# Patient Record
Sex: MALE | Race: WHITE | Employment: OTHER | URBAN - METROPOLITAN AREA
[De-identification: names, ages, dates, MRNs, and addresses within clinical notes are randomized per-mention and may not be internally consistent; named-entity substitution may affect disease eponyms.]

---

## 2017-07-13 ENCOUNTER — OFFICE VISIT (OUTPATIENT)
Dept: INTERNAL MEDICINE CLINIC | Age: 57
End: 2017-07-13

## 2017-07-13 VITALS — SYSTOLIC BLOOD PRESSURE: 136 MMHG | HEART RATE: 88 BPM | OXYGEN SATURATION: 97 % | DIASTOLIC BLOOD PRESSURE: 68 MMHG

## 2017-07-13 DIAGNOSIS — H66.91 RIGHT OTITIS MEDIA, UNSPECIFIED CHRONICITY, UNSPECIFIED OTITIS MEDIA TYPE: Primary | ICD-10-CM

## 2017-07-13 PROCEDURE — 99213 OFFICE O/P EST LOW 20 MIN: CPT | Performed by: NURSE PRACTITIONER

## 2017-07-13 RX ORDER — FLUTICASONE PROPIONATE 50 MCG
1 SPRAY, SUSPENSION (ML) NASAL DAILY
Qty: 1 BOTTLE | Refills: 3 | Status: SHIPPED | OUTPATIENT
Start: 2017-07-13 | End: 2021-04-15

## 2017-07-13 RX ORDER — AZITHROMYCIN 250 MG/1
250 TABLET, FILM COATED ORAL DAILY
Qty: 10 TABLET | Refills: 0 | Status: SHIPPED | OUTPATIENT
Start: 2017-07-13 | End: 2017-07-23

## 2017-10-23 ENCOUNTER — OFFICE VISIT (OUTPATIENT)
Dept: INTERNAL MEDICINE CLINIC | Age: 57
End: 2017-10-23

## 2017-10-23 VITALS
OXYGEN SATURATION: 97 % | HEIGHT: 74 IN | BODY MASS INDEX: 34.55 KG/M2 | HEART RATE: 72 BPM | SYSTOLIC BLOOD PRESSURE: 110 MMHG | WEIGHT: 269.2 LBS | DIASTOLIC BLOOD PRESSURE: 74 MMHG

## 2017-10-23 DIAGNOSIS — Z12.12 SCREENING FOR COLORECTAL CANCER: ICD-10-CM

## 2017-10-23 DIAGNOSIS — R21 RASH: ICD-10-CM

## 2017-10-23 DIAGNOSIS — Z23 NEED FOR IMMUNIZATION AGAINST INFLUENZA: ICD-10-CM

## 2017-10-23 DIAGNOSIS — Z00.00 ROUTINE GENERAL MEDICAL EXAMINATION AT A HEALTH CARE FACILITY: Primary | ICD-10-CM

## 2017-10-23 DIAGNOSIS — Z12.11 SCREENING FOR COLORECTAL CANCER: ICD-10-CM

## 2017-10-23 DIAGNOSIS — Z00.00 PREVENTATIVE HEALTH CARE: ICD-10-CM

## 2017-10-23 DIAGNOSIS — I10 ESSENTIAL HYPERTENSION: ICD-10-CM

## 2017-10-23 DIAGNOSIS — Z12.5 SCREENING FOR PROSTATE CANCER: ICD-10-CM

## 2017-10-23 DIAGNOSIS — E78.2 MIXED HYPERLIPIDEMIA: ICD-10-CM

## 2017-10-23 DIAGNOSIS — F17.200 TOBACCO DEPENDENCE: ICD-10-CM

## 2017-10-23 PROCEDURE — 99396 PREV VISIT EST AGE 40-64: CPT | Performed by: INTERNAL MEDICINE

## 2017-10-23 PROCEDURE — 90688 IIV4 VACCINE SPLT 0.5 ML IM: CPT | Performed by: INTERNAL MEDICINE

## 2017-10-23 PROCEDURE — 90471 IMMUNIZATION ADMIN: CPT | Performed by: INTERNAL MEDICINE

## 2017-10-23 RX ORDER — CLOTRIMAZOLE AND BETAMETHASONE DIPROPIONATE 10; .64 MG/G; MG/G
CREAM TOPICAL 2 TIMES DAILY
Qty: 3 TUBE | Refills: 1 | Status: SHIPPED | OUTPATIENT
Start: 2017-10-23 | End: 2021-08-12

## 2017-10-23 RX ORDER — AMLODIPINE BESYLATE 5 MG/1
5 TABLET ORAL DAILY
Qty: 90 TABLET | Refills: 3 | Status: SHIPPED | OUTPATIENT
Start: 2017-10-23 | End: 2018-07-06 | Stop reason: SDUPTHER

## 2017-10-23 RX ORDER — LOVASTATIN 10 MG/1
10 TABLET ORAL NIGHTLY
Qty: 90 TABLET | Refills: 3 | Status: SHIPPED | OUTPATIENT
Start: 2017-10-23 | End: 2018-07-06 | Stop reason: SDUPTHER

## 2017-10-23 NOTE — PROGRESS NOTES
History and Physical      Aquiles Diaz  YOB: 1960    Date of Service:  10/23/2017    Chief Complaint:   Aquiles Diaz is a 59 y.o. male who presents for complete physical examination. HPI:   Hypertension: Stable. Denies CP, SOB, cough, visual changes, dizziness, palpitations or HA. Due for colonoscopy and scr PSA, states wants to sched w Dr Lyric Jernigan. Continues to smoke and does not want to quit at this time. Stress levels better at work. Due for flu shot    Lipids:  Is continuing statin therapy and low fat diet. Tolerating medications w/o myalgias or GI upset.         Wt Readings from Last 3 Encounters:   10/23/17 269 lb 3.2 oz (122.1 kg)   12/28/16 266 lb (120.7 kg)   07/06/16 265 lb 3.2 oz (120.3 kg)     BP Readings from Last 3 Encounters:   10/23/17 110/74   07/13/17 136/68   12/28/16 128/70       Patient Active Problem List   Diagnosis    Hypertension    Hyperlipidemia    Leg pain    Tobacco dependence    Eczematous dermatitis       Preventive Care:  Health Maintenance   Topic Date Due    Hepatitis C screen  1960    HIV screen  02/02/1973    DTaP/Tdap/Td vaccine (1 - Tdap) 02/02/1977    Colon Cancer Screen FIT/FOBT  07/12/2013    Flu vaccine (1) 09/01/2017    Lipid screen  07/06/2021    Pneumococcal med risk  Completed        Lipid panel:    Lab Results   Component Value Date    TRIG 200 07/06/2016    HDL 47 07/06/2016    LDLCALC 112 07/06/2016         Immunization History   Administered Date(s) Administered    Influenza Virus Vaccine 01/14/2013    Pneumococcal Polysaccharide ([id number]) 07/06/2016       Allergies   Allergen Reactions    Hctz      Lightheadedness/nausea- had syncopal episode     Current Outpatient Prescriptions   Medication Sig Dispense Refill    fluticasone (FLONASE) 50 MCG/ACT nasal spray 1 spray by Nasal route daily 1 Bottle 3    amLODIPine (NORVASC) 5 MG tablet Take 1 tablet by mouth daily 90 tablet 3    lovastatin (MEVACOR) 10 MG tablet Take 1 tablet by mouth nightly 90 tablet 3    clotrimazole-betamethasone (LOTRISONE) 1-0.05 % cream Apply topically 2 times daily 3 Tube 1    acetaminophen (TYLENOL) 500 MG tablet Take 500 mg by mouth as needed.  predniSONE (DELTASONE) 5 MG tablet Take 6 tablets by mouth on day 1, 5 on day 2, 4 on day 3, 3 on day 4, 2 on day 5, 1 on day 6. 21 tablet 0    guaiFENesin-codeine (GUAIFENESIN AC) 100-10 MG/5ML liquid Take 5 mLs by mouth 3 times daily as needed for Cough 150 mL 0    augmented betamethasone dipropionate (DIPROLENE-AF) 0.05 % cream APPLY TO AFFECTED AREA(S) DAILY 5 Tube 5     No current facility-administered medications for this visit. Past Medical History:   Diagnosis Date    Eczematous dermatitis     Dr Jose Angel Ziegler, prn halog cream    Family history of coronary artery disease     Hyperlipidemia     Hypertension     Leg pain     R leg, chr pain.  Skin lesion     Tobacco dependence      History reviewed. No pertinent surgical history. Family History   Problem Relation Age of Onset    Other Father       of MI    Cancer Mother      \"mass behind kidney\"     Social History     Social History    Marital status:      Spouse name: N/A    Number of children: N/A    Years of education: N/A     Occupational History    as noted      [employer]     Social History Main Topics    Smoking status: Current Every Day Smoker     Packs/day: 0.50     Years: 30.00     Types: Cigarettes    Smokeless tobacco: Never Used    Alcohol use 0.0 oz/week      Comment: 6 pack a week    Drug use: Unknown    Sexual activity: Not on file     Other Topics Concern    Not on file     Social History Narrative    Diet: Unrestricted    Exercise: None    Seat Belt: Always       Review of Systems:  A comprehensive review of systems was negative except for what was noted in the HPI.     Physical Exam:   Vitals:    10/23/17 1143   BP: 110/74   Site: Left Arm   Position: Sitting   Cuff Size: Large Adult electrolytes, lipid profile. -     amLODIPine (NORVASC) 5 MG tablet; Take 1 tablet by mouth daily  -     Lipid Panel  -     Comprehensive Metabolic Panel  -     CBC Auto Differential  -     TSH with Reflex    Mixed hyperlipidemia - Pt will continue to work on a low fat diet and also exercise, wt loss as appropriate. Will continue periodic monitoring of fasting lipid profile, glucose, liver function. -     lovastatin (MEVACOR) 10 MG tablet; Take 1 tablet by mouth nightly  -     Lipid Panel  -     Comprehensive Metabolic Panel  -     CBC Auto Differential  -     TSH with Reflex    Rash- PERIODIC GROIN, CONT RX PRN  -     clotrimazole-betamethasone (LOTRISONE) 1-0.05 % cream; Apply topically 2 times daily    Preventative health care  -     INFLUENZA, QUADV, 3 YRS AND OLDER, IM, MDV, 0.5ML (Avelino Nani)    Screening for prostate cancer  -     PSA PROSTATIC SPECIFIC ANTIGEN    Tobacco dependence -  Strongly advised smoking cessation and encouraged to keep working on cutting down use as much as possible.        Need for immunization against influenza  -     Cancel: INFLUENZA, QUADV, 3 YRS AND OLDER, IM, MDV, 0.5ML (Avelino Nani)    Screening for colorectal cancer  -     Jose Austin MD- Candi Kohler MD (GAVIN)

## 2017-10-24 LAB
A/G RATIO: 2.1 (CALC) (ref 0.8–2.6)
ALBUMIN SERPL-MCNC: 4.6 GM/DL (ref 3.5–5.2)
ALP BLD-CCNC: 70 U/L (ref 23–144)
ALT SERPL-CCNC: 33 U/L (ref 0–60)
AST SERPL-CCNC: 22 U/L (ref 0–46)
BASOPHILS ABSOLUTE: 0 K/MM3 (ref 0–0.3)
BASOPHILS RELATIVE PERCENT: 0.5 % (ref 0–2)
BILIRUB SERPL-MCNC: 0.4 MG/DL (ref 0–1.2)
BUN / CREAT RATIO: 15 (CALC) (ref 7–25)
BUN BLDV-MCNC: 12 MG/DL (ref 3–29)
CALCIUM SERPL-MCNC: 9.5 MG/DL (ref 8.5–10.5)
CHLORIDE BLD-SCNC: 101 MEQ/L (ref 96–110)
CHOLESTEROL, TOTAL: 195 MG/DL
CO2: 26 MEQ/L (ref 19–32)
COPY(IES) SENT TO:: NORMAL
CREAT SERPL-MCNC: 0.8 MG/DL
EOSINOPHILS ABSOLUTE: 0.2 K/MM3 (ref 0–0.6)
EOSINOPHILS RELATIVE PERCENT: 2 % (ref 0–7)
GFR SERPL CREATININE-BSD FRML MDRD: 98 ML/MIN/1.73M2
GLOBULIN: 2.2 GM/DL (CALC) (ref 1.9–3.6)
GLUCOSE BLD-MCNC: 74 MG/DL
HCT VFR BLD CALC: 47.2 % (ref 41–50)
HDLC SERPL-MCNC: 46 MG/DL
HEMOGLOBIN: 15.5 G/DL (ref 13.8–17.2)
HEPATITIS C ANTIBODY: NEGATIVE
LDL CHOLESTEROL: 97 MG/DL (CALC)
LEUKOCYTES, UA: 8.4 K/MM3 (ref 3.8–10.8)
LYMPHOCYTES ABSOLUTE: 2.1 K/MM3 (ref 0.9–4.1)
LYMPHOCYTES RELATIVE PERCENT: 24.5 % (ref 18–47)
MCH RBC QN AUTO: 30.4 PG (ref 27–33)
MCHC RBC AUTO-ENTMCNC: 32.8 G/DL (ref 32–36)
MCV RBC AUTO: 92.9 FL (ref 80–100)
MONOCYTES ABSOLUTE: 0.7 K/MM3 (ref 0.2–1.1)
MONOCYTES RELATIVE PERCENT: 8.3 % (ref 0–14)
NEUTROPHILS ABSOLUTE: 5.4 K/MM3 (ref 1.5–7.8)
PDW BLD-RTO: 14.5 % (ref 9–15)
PLATELET # BLD: 231 K/MM3 (ref 130–400)
POTASSIUM SERPL-SCNC: 4.2 MEQ/L (ref 3.4–5.3)
PROSTATE SPECIFIC ANTIGEN: 1.53 NG/ML
RBC # BLD: 5.08 M/MM3 (ref 4.4–5.8)
SEGMENTED NEUTROPHILS RELATIVE PERCENT: 64.7 % (ref 40–75)
SODIUM BLD-SCNC: 142 MEQ/L (ref 135–148)
TOTAL PROTEIN: 6.8 GM/DL (ref 6–8.3)
TRIGL SERPL-MCNC: 258 MG/DL
TSH SERPL DL<=0.05 MIU/L-ACNC: 1.94 MICRO IU/ML (ref 0.4–4)
VLDLC SERPL CALC-MCNC: 52 MG/DL (CALC) (ref 4–38)

## 2018-02-13 ENCOUNTER — TELEPHONE (OUTPATIENT)
Dept: INTERNAL MEDICINE CLINIC | Age: 58
End: 2018-02-13

## 2018-02-13 NOTE — TELEPHONE ENCOUNTER
Wife LM stating that once a year, you sign an order for restrictions on a prescription pad. Order states \"update on restrictions.  REF [id number]. 2/13/2018-2/13/2019\"

## 2018-07-06 ENCOUNTER — OFFICE VISIT (OUTPATIENT)
Dept: INTERNAL MEDICINE CLINIC | Age: 58
End: 2018-07-06

## 2018-07-06 VITALS
OXYGEN SATURATION: 98 % | DIASTOLIC BLOOD PRESSURE: 72 MMHG | BODY MASS INDEX: 33.45 KG/M2 | HEART RATE: 76 BPM | HEIGHT: 75 IN | RESPIRATION RATE: 16 BRPM | SYSTOLIC BLOOD PRESSURE: 130 MMHG | WEIGHT: 269 LBS

## 2018-07-06 DIAGNOSIS — I10 ESSENTIAL HYPERTENSION: ICD-10-CM

## 2018-07-06 DIAGNOSIS — Z12.11 SCREENING FOR COLORECTAL CANCER: ICD-10-CM

## 2018-07-06 DIAGNOSIS — Z12.12 SCREENING FOR COLORECTAL CANCER: ICD-10-CM

## 2018-07-06 DIAGNOSIS — I87.2 VENOUS INSUFFICIENCY OF BOTH LOWER EXTREMITIES: ICD-10-CM

## 2018-07-06 DIAGNOSIS — E78.2 MIXED HYPERLIPIDEMIA: ICD-10-CM

## 2018-07-06 DIAGNOSIS — Z12.5 SCREENING FOR MALIGNANT NEOPLASM OF PROSTATE: ICD-10-CM

## 2018-07-06 DIAGNOSIS — L30.9 ECZEMA, UNSPECIFIED TYPE: ICD-10-CM

## 2018-07-06 DIAGNOSIS — Z00.00 ROUTINE GENERAL MEDICAL EXAMINATION AT A HEALTH CARE FACILITY: Primary | ICD-10-CM

## 2018-07-06 DIAGNOSIS — R21 RASH: ICD-10-CM

## 2018-07-06 DIAGNOSIS — F17.200 TOBACCO DEPENDENCE: ICD-10-CM

## 2018-07-06 LAB
A/G RATIO: 2 (ref 1.1–2.2)
ALBUMIN SERPL-MCNC: 4.6 G/DL (ref 3.4–5)
ALP BLD-CCNC: 77 U/L (ref 40–129)
ALT SERPL-CCNC: 29 U/L (ref 10–40)
ANION GAP SERPL CALCULATED.3IONS-SCNC: 9 MMOL/L (ref 3–16)
AST SERPL-CCNC: 23 U/L (ref 15–37)
BASOPHILS ABSOLUTE: 0.1 K/UL (ref 0–0.2)
BASOPHILS RELATIVE PERCENT: 0.7 %
BILIRUB SERPL-MCNC: <0.2 MG/DL (ref 0–1)
BUN BLDV-MCNC: 10 MG/DL (ref 7–20)
CALCIUM SERPL-MCNC: 9.3 MG/DL (ref 8.3–10.6)
CHLORIDE BLD-SCNC: 105 MMOL/L (ref 99–110)
CHOLESTEROL, TOTAL: 188 MG/DL (ref 0–199)
CO2: 27 MMOL/L (ref 21–32)
CREAT SERPL-MCNC: 0.9 MG/DL (ref 0.8–1.3)
EOSINOPHILS ABSOLUTE: 0.2 K/UL (ref 0–0.6)
EOSINOPHILS RELATIVE PERCENT: 1.9 %
GFR AFRICAN AMERICAN: >60
GFR NON-AFRICAN AMERICAN: >60
GLOBULIN: 2.3 G/DL
GLUCOSE BLD-MCNC: 100 MG/DL (ref 70–99)
HCT VFR BLD CALC: 45.3 % (ref 40.5–52.5)
HDLC SERPL-MCNC: 44 MG/DL (ref 40–60)
HEMOGLOBIN: 15.5 G/DL (ref 13.5–17.5)
LDL CHOLESTEROL CALCULATED: 102 MG/DL
LYMPHOCYTES ABSOLUTE: 1.9 K/UL (ref 1–5.1)
LYMPHOCYTES RELATIVE PERCENT: 22.6 %
MCH RBC QN AUTO: 31.5 PG (ref 26–34)
MCHC RBC AUTO-ENTMCNC: 34.3 G/DL (ref 31–36)
MCV RBC AUTO: 92.1 FL (ref 80–100)
MONOCYTES ABSOLUTE: 0.7 K/UL (ref 0–1.3)
MONOCYTES RELATIVE PERCENT: 8.5 %
NEUTROPHILS ABSOLUTE: 5.6 K/UL (ref 1.7–7.7)
NEUTROPHILS RELATIVE PERCENT: 66.3 %
PDW BLD-RTO: 14.1 % (ref 12.4–15.4)
PLATELET # BLD: 229 K/UL (ref 135–450)
PMV BLD AUTO: 7.7 FL (ref 5–10.5)
POTASSIUM SERPL-SCNC: 5 MMOL/L (ref 3.5–5.1)
PROSTATE SPECIFIC ANTIGEN: 2.7 NG/ML (ref 0–4)
RBC # BLD: 4.92 M/UL (ref 4.2–5.9)
SODIUM BLD-SCNC: 141 MMOL/L (ref 136–145)
TOTAL PROTEIN: 6.9 G/DL (ref 6.4–8.2)
TRIGL SERPL-MCNC: 211 MG/DL (ref 0–150)
TSH REFLEX: 3.14 UIU/ML (ref 0.27–4.2)
VLDLC SERPL CALC-MCNC: 42 MG/DL
WBC # BLD: 8.4 K/UL (ref 4–11)

## 2018-07-06 PROCEDURE — 99396 PREV VISIT EST AGE 40-64: CPT | Performed by: INTERNAL MEDICINE

## 2018-07-06 RX ORDER — LOVASTATIN 10 MG/1
10 TABLET ORAL NIGHTLY
Qty: 90 TABLET | Refills: 3 | Status: SHIPPED | OUTPATIENT
Start: 2018-07-06 | End: 2019-08-13 | Stop reason: SDUPTHER

## 2018-07-06 RX ORDER — AMLODIPINE BESYLATE 5 MG/1
5 TABLET ORAL DAILY
Qty: 90 TABLET | Refills: 3 | Status: SHIPPED | OUTPATIENT
Start: 2018-07-06 | End: 2019-08-13 | Stop reason: SDUPTHER

## 2018-07-06 RX ORDER — BETAMETHASONE DIPROPIONATE 0.5 MG/G
CREAM TOPICAL
Qty: 5 TUBE | Refills: 6 | Status: SHIPPED | OUTPATIENT
Start: 2018-07-06 | End: 2019-08-13 | Stop reason: SDUPTHER

## 2018-07-06 ASSESSMENT — PATIENT HEALTH QUESTIONNAIRE - PHQ9
2. FEELING DOWN, DEPRESSED OR HOPELESS: 0
SUM OF ALL RESPONSES TO PHQ9 QUESTIONS 1 & 2: 0
SUM OF ALL RESPONSES TO PHQ QUESTIONS 1-9: 0
1. LITTLE INTEREST OR PLEASURE IN DOING THINGS: 0

## 2018-07-06 NOTE — PROGRESS NOTES
distress. HEENT:   Head: Normocephalic and atraumatic. Nose: Nose normal.   Mouth/Throat: Oropharynx is clear and moist and mucous membranes are normal. No oropharyngeal exudate or posterior oropharyngeal erythema. He has no cervical adenopathy. Eyes: Conjunctivae and extraocular motions are normal. Pupils are equal, round, and reactive to light. Neck: Full passive range of motion without pain. Neck supple. No JVD present. No mass and no thyromegaly present. Cardiovascular: Normal rate, regular rhythm, normal heart sounds and intact distal pulses. Exam reveals no gallop and no friction rub. No murmur heard. Pulmonary/Chest: Effort normal and breath sounds normal. No respiratory distress. He has no wheezes, rhonchi or rales. Abdominal: Soft, non-tender. Bowel sounds and aorta are normal. There is no organomegaly, mass or bruit. Musculoskeletal: Normal range of motion, no synovitis. He exhibits no edema. SMALL VARICOSE V'S NOTED R>L LEGS  Neurological: He is alert and oriented to person, place, and time. No cranial nerve deficit. Coordination normal.   Skin: Skin is warm, dry and intact. Psychiatric: He has a normal mood and affect. His speech is normal and behavior is normal. Judgment, cognition and memory are normal.         Assessment/Plan:    Verenice Medina was seen today for 6 month follow-up. Diagnoses and all orders for this visit:    Routine general medical examination at a health care facility - Overall general medical exam appears benign, other assessments as noted and testing is as noted below. Essential hypertension - Blood pressure stable and will continue current regimen. Will plan periodic monitoring of renal function, electrolytes, lipid profile. -     amLODIPine (NORVASC) 5 MG tablet; Take 1 tablet by mouth daily  -     Lipid Panel; Future  -     Comprehensive Metabolic Panel; Future  -     TSH with Reflex;  Future  -     CBC Auto Differential; Future    Mixed hyperlipidemia - Pt will continue to work on a low fat diet and also exercise, wt loss as appropriate. Will continue periodic monitoring of fasting lipid profile, glucose, liver function. -     lovastatin (MEVACOR) 10 MG tablet; Take 1 tablet by mouth nightly  -     Lipid Panel; Future  -     Comprehensive Metabolic Panel; Future  -     TSH with Reflex; Future  -     CBC Auto Differential; Future    Tobacco dependence -  Strongly advised smoking cessation and encouraged to keep working on cutting down use as much as possible. Screening for colorectal cancer- DEFERS COLONOSCOPY  -     BLOOD OCCULT STOOL #1; Future    Screening for malignant neoplasm of prostate - DUE  -     PSA PROSTATIC SPECIFIC ANTIGEN; Future    Eczema, unspecified type- CONT PRN TOPICAL STEROID, IF WORSENS MAY NEED DERM EVAL.       Rash  -     augmented betamethasone dipropionate (DIPROLENE-AF) 0.05 % cream; APPLY TO AFFECTED AREA(S) DAILY    Venous insufficiency of both lower extremities- COULD EXACERBATE HIS LEG RASH, REC SURGERY EVAL FOR POSSIBLE ABLATION/VNUS, HE IS TO CALL FOR APPT W DR BURNS WHEN HIS SCHED ALLOWS  -     External Referral To General Surgery

## 2018-12-07 ENCOUNTER — OFFICE VISIT (OUTPATIENT)
Dept: INTERNAL MEDICINE CLINIC | Age: 58
End: 2018-12-07
Payer: COMMERCIAL

## 2018-12-07 VITALS
BODY MASS INDEX: 33.12 KG/M2 | SYSTOLIC BLOOD PRESSURE: 127 MMHG | RESPIRATION RATE: 15 BRPM | HEART RATE: 83 BPM | OXYGEN SATURATION: 98 % | WEIGHT: 265 LBS | DIASTOLIC BLOOD PRESSURE: 72 MMHG

## 2018-12-07 DIAGNOSIS — J01.00 ACUTE NON-RECURRENT MAXILLARY SINUSITIS: Primary | ICD-10-CM

## 2018-12-07 PROCEDURE — 99213 OFFICE O/P EST LOW 20 MIN: CPT | Performed by: INTERNAL MEDICINE

## 2018-12-07 RX ORDER — BENZONATATE 100 MG/1
100 CAPSULE ORAL 2 TIMES DAILY PRN
Qty: 20 CAPSULE | Refills: 0 | Status: SHIPPED | OUTPATIENT
Start: 2018-12-07 | End: 2018-12-14

## 2018-12-07 RX ORDER — AZITHROMYCIN 250 MG/1
250 TABLET, FILM COATED ORAL SEE ADMIN INSTRUCTIONS
Qty: 6 TABLET | Refills: 0 | Status: SHIPPED | OUTPATIENT
Start: 2018-12-07 | End: 2018-12-12

## 2018-12-07 RX ORDER — PREDNISONE 1 MG/1
TABLET ORAL
Qty: 36 TABLET | Refills: 0 | Status: SHIPPED | OUTPATIENT
Start: 2018-12-07 | End: 2019-08-13 | Stop reason: ALTCHOICE

## 2019-08-13 ENCOUNTER — OFFICE VISIT (OUTPATIENT)
Dept: INTERNAL MEDICINE CLINIC | Age: 59
End: 2019-08-13
Payer: COMMERCIAL

## 2019-08-13 VITALS
OXYGEN SATURATION: 97 % | RESPIRATION RATE: 14 BRPM | HEART RATE: 60 BPM | SYSTOLIC BLOOD PRESSURE: 126 MMHG | WEIGHT: 261 LBS | DIASTOLIC BLOOD PRESSURE: 78 MMHG | HEIGHT: 75 IN | BODY MASS INDEX: 32.45 KG/M2

## 2019-08-13 DIAGNOSIS — I10 ESSENTIAL HYPERTENSION: ICD-10-CM

## 2019-08-13 DIAGNOSIS — E78.2 MIXED HYPERLIPIDEMIA: ICD-10-CM

## 2019-08-13 DIAGNOSIS — Z00.00 ROUTINE GENERAL MEDICAL EXAMINATION AT A HEALTH CARE FACILITY: Primary | ICD-10-CM

## 2019-08-13 DIAGNOSIS — Z12.11 SCREENING FOR COLORECTAL CANCER: ICD-10-CM

## 2019-08-13 DIAGNOSIS — R21 RASH: ICD-10-CM

## 2019-08-13 DIAGNOSIS — H93.8X1 CONGESTION OF RIGHT EAR: ICD-10-CM

## 2019-08-13 DIAGNOSIS — Z12.12 SCREENING FOR COLORECTAL CANCER: ICD-10-CM

## 2019-08-13 LAB
A/G RATIO: 2.4 (ref 1.1–2.2)
ALBUMIN SERPL-MCNC: 4.8 G/DL (ref 3.4–5)
ALP BLD-CCNC: 70 U/L (ref 40–129)
ALT SERPL-CCNC: 21 U/L (ref 10–40)
ANION GAP SERPL CALCULATED.3IONS-SCNC: 16 MMOL/L (ref 3–16)
AST SERPL-CCNC: 22 U/L (ref 15–37)
BASOPHILS ABSOLUTE: 0 K/UL (ref 0–0.2)
BASOPHILS RELATIVE PERCENT: 0.5 %
BILIRUB SERPL-MCNC: 0.5 MG/DL (ref 0–1)
BILIRUBIN URINE: NEGATIVE
BLOOD, URINE: NEGATIVE
BUN BLDV-MCNC: 8 MG/DL (ref 7–20)
CALCIUM SERPL-MCNC: 10 MG/DL (ref 8.3–10.6)
CHLORIDE BLD-SCNC: 104 MMOL/L (ref 99–110)
CHOLESTEROL, TOTAL: 161 MG/DL (ref 0–199)
CLARITY: CLEAR
CO2: 25 MMOL/L (ref 21–32)
COLOR: YELLOW
CREAT SERPL-MCNC: 0.8 MG/DL (ref 0.8–1.3)
EOSINOPHILS ABSOLUTE: 0.2 K/UL (ref 0–0.6)
EOSINOPHILS RELATIVE PERCENT: 2.2 %
EPITHELIAL CELLS, UA: 0 /HPF (ref 0–5)
GFR AFRICAN AMERICAN: >60
GFR NON-AFRICAN AMERICAN: >60
GLOBULIN: 2 G/DL
GLUCOSE BLD-MCNC: 92 MG/DL (ref 70–99)
GLUCOSE URINE: NEGATIVE MG/DL
HCT VFR BLD CALC: 46.4 % (ref 40.5–52.5)
HDLC SERPL-MCNC: 49 MG/DL (ref 40–60)
HEMOGLOBIN: 15.5 G/DL (ref 13.5–17.5)
HYALINE CASTS: 0 /LPF (ref 0–8)
KETONES, URINE: NEGATIVE MG/DL
LDL CHOLESTEROL CALCULATED: 87 MG/DL
LEUKOCYTE ESTERASE, URINE: NEGATIVE
LYMPHOCYTES ABSOLUTE: 1.6 K/UL (ref 1–5.1)
LYMPHOCYTES RELATIVE PERCENT: 23.5 %
MCH RBC QN AUTO: 31.6 PG (ref 26–34)
MCHC RBC AUTO-ENTMCNC: 33.5 G/DL (ref 31–36)
MCV RBC AUTO: 94.5 FL (ref 80–100)
MICROSCOPIC EXAMINATION: YES
MONOCYTES ABSOLUTE: 0.6 K/UL (ref 0–1.3)
MONOCYTES RELATIVE PERCENT: 8.7 %
NEUTROPHILS ABSOLUTE: 4.5 K/UL (ref 1.7–7.7)
NEUTROPHILS RELATIVE PERCENT: 65.1 %
NITRITE, URINE: NEGATIVE
PDW BLD-RTO: 14.2 % (ref 12.4–15.4)
PH UA: 7.5 (ref 5–8)
PLATELET # BLD: 239 K/UL (ref 135–450)
PMV BLD AUTO: 7.6 FL (ref 5–10.5)
POTASSIUM SERPL-SCNC: 5.3 MMOL/L (ref 3.5–5.1)
PROTEIN UA: ABNORMAL MG/DL
RBC # BLD: 4.91 M/UL (ref 4.2–5.9)
RBC UA: 3 /HPF (ref 0–4)
SODIUM BLD-SCNC: 145 MMOL/L (ref 136–145)
SPECIFIC GRAVITY UA: 1.02 (ref 1–1.03)
TOTAL PROTEIN: 6.8 G/DL (ref 6.4–8.2)
TRIGL SERPL-MCNC: 127 MG/DL (ref 0–150)
TSH SERPL DL<=0.05 MIU/L-ACNC: 1.87 UIU/ML (ref 0.27–4.2)
URINE TYPE: ABNORMAL
UROBILINOGEN, URINE: 0.2 E.U./DL
VLDLC SERPL CALC-MCNC: 25 MG/DL
WBC # BLD: 6.9 K/UL (ref 4–11)
WBC UA: 0 /HPF (ref 0–5)

## 2019-08-13 PROCEDURE — 99396 PREV VISIT EST AGE 40-64: CPT | Performed by: INTERNAL MEDICINE

## 2019-08-13 RX ORDER — FLUTICASONE PROPIONATE 50 MCG
2 SPRAY, SUSPENSION (ML) NASAL DAILY
Qty: 1 BOTTLE | Refills: 0 | Status: SHIPPED | OUTPATIENT
Start: 2019-08-13 | End: 2019-10-04

## 2019-08-13 RX ORDER — PREDNISONE 1 MG/1
TABLET ORAL
Qty: 21 TABLET | Refills: 0 | Status: SHIPPED | OUTPATIENT
Start: 2019-08-13 | End: 2019-10-04 | Stop reason: ALTCHOICE

## 2019-08-13 RX ORDER — LOVASTATIN 10 MG/1
10 TABLET ORAL NIGHTLY
Qty: 90 TABLET | Refills: 3 | Status: SHIPPED | OUTPATIENT
Start: 2019-08-13 | End: 2020-07-01 | Stop reason: SDUPTHER

## 2019-08-13 RX ORDER — BETAMETHASONE DIPROPIONATE 0.5 MG/G
CREAM TOPICAL
Qty: 5 TUBE | Refills: 6 | Status: SHIPPED | OUTPATIENT
Start: 2019-08-13 | End: 2020-07-01 | Stop reason: SDUPTHER

## 2019-08-13 RX ORDER — AMLODIPINE BESYLATE 5 MG/1
5 TABLET ORAL DAILY
Qty: 90 TABLET | Refills: 3 | Status: SHIPPED | OUTPATIENT
Start: 2019-08-13 | End: 2021-04-28

## 2019-08-13 ASSESSMENT — PATIENT HEALTH QUESTIONNAIRE - PHQ9
1. LITTLE INTEREST OR PLEASURE IN DOING THINGS: 0
SUM OF ALL RESPONSES TO PHQ QUESTIONS 1-9: 0
2. FEELING DOWN, DEPRESSED OR HOPELESS: 0
SUM OF ALL RESPONSES TO PHQ QUESTIONS 1-9: 0
SUM OF ALL RESPONSES TO PHQ9 QUESTIONS 1 & 2: 0

## 2019-08-13 NOTE — PROGRESS NOTES
day 2, 4 on day 3, 3 on day 4, 2 on day 5, 1 on day 6.     Screening for colorectal cancer- due for screening and pt req Dr Jorge Reid who has taken care of his wife in the past  -     AFL (Shereen Self) - Rafi Santana MD, Gastroenterology, [facility]

## 2019-10-04 ENCOUNTER — OFFICE VISIT (OUTPATIENT)
Dept: INTERNAL MEDICINE CLINIC | Age: 59
End: 2019-10-04
Payer: COMMERCIAL

## 2019-10-04 VITALS
RESPIRATION RATE: 16 BRPM | OXYGEN SATURATION: 96 % | HEART RATE: 72 BPM | DIASTOLIC BLOOD PRESSURE: 76 MMHG | SYSTOLIC BLOOD PRESSURE: 124 MMHG

## 2019-10-04 DIAGNOSIS — J01.00 ACUTE NON-RECURRENT MAXILLARY SINUSITIS: Primary | ICD-10-CM

## 2019-10-04 PROCEDURE — 99213 OFFICE O/P EST LOW 20 MIN: CPT | Performed by: INTERNAL MEDICINE

## 2019-10-04 RX ORDER — BENZONATATE 100 MG/1
100 CAPSULE ORAL 3 TIMES DAILY PRN
Qty: 30 CAPSULE | Refills: 0 | Status: SHIPPED | OUTPATIENT
Start: 2019-10-04 | End: 2019-10-11

## 2019-10-04 RX ORDER — PREDNISONE 1 MG/1
TABLET ORAL
Qty: 21 TABLET | Refills: 0 | Status: SHIPPED | OUTPATIENT
Start: 2019-10-04 | End: 2020-07-01

## 2019-10-04 RX ORDER — AZITHROMYCIN 250 MG/1
250 TABLET, FILM COATED ORAL SEE ADMIN INSTRUCTIONS
Qty: 6 TABLET | Refills: 0 | Status: SHIPPED | OUTPATIENT
Start: 2019-10-04 | End: 2019-10-09

## 2020-07-01 ENCOUNTER — OFFICE VISIT (OUTPATIENT)
Dept: INTERNAL MEDICINE CLINIC | Age: 60
End: 2020-07-01
Payer: COMMERCIAL

## 2020-07-01 VITALS
OXYGEN SATURATION: 96 % | DIASTOLIC BLOOD PRESSURE: 58 MMHG | RESPIRATION RATE: 16 BRPM | BODY MASS INDEX: 33.5 KG/M2 | SYSTOLIC BLOOD PRESSURE: 139 MMHG | WEIGHT: 268 LBS | HEART RATE: 88 BPM

## 2020-07-01 LAB
A/G RATIO: 2 (ref 1.1–2.2)
ALBUMIN SERPL-MCNC: 4.7 G/DL (ref 3.4–5)
ALP BLD-CCNC: 76 U/L (ref 40–129)
ALT SERPL-CCNC: 34 U/L (ref 10–40)
ANION GAP SERPL CALCULATED.3IONS-SCNC: 11 MMOL/L (ref 3–16)
AST SERPL-CCNC: 28 U/L (ref 15–37)
BASOPHILS ABSOLUTE: 0 K/UL (ref 0–0.2)
BASOPHILS RELATIVE PERCENT: 0.2 %
BILIRUB SERPL-MCNC: 0.3 MG/DL (ref 0–1)
BUN BLDV-MCNC: 10 MG/DL (ref 7–20)
CALCIUM SERPL-MCNC: 9.4 MG/DL (ref 8.3–10.6)
CHLORIDE BLD-SCNC: 102 MMOL/L (ref 99–110)
CHOLESTEROL, TOTAL: 178 MG/DL (ref 0–199)
CO2: 27 MMOL/L (ref 21–32)
CREAT SERPL-MCNC: 0.9 MG/DL (ref 0.8–1.3)
EOSINOPHILS ABSOLUTE: 0.1 K/UL (ref 0–0.6)
EOSINOPHILS RELATIVE PERCENT: 1.8 %
GFR AFRICAN AMERICAN: >60
GFR NON-AFRICAN AMERICAN: >60
GLOBULIN: 2.4 G/DL
GLUCOSE BLD-MCNC: 112 MG/DL (ref 70–99)
HCT VFR BLD CALC: 47.6 % (ref 40.5–52.5)
HDLC SERPL-MCNC: 44 MG/DL (ref 40–60)
HEMOGLOBIN: 16 G/DL (ref 13.5–17.5)
LDL CHOLESTEROL CALCULATED: 84 MG/DL
LYMPHOCYTES ABSOLUTE: 1.5 K/UL (ref 1–5.1)
LYMPHOCYTES RELATIVE PERCENT: 18.9 %
MCH RBC QN AUTO: 31.4 PG (ref 26–34)
MCHC RBC AUTO-ENTMCNC: 33.5 G/DL (ref 31–36)
MCV RBC AUTO: 93.6 FL (ref 80–100)
MONOCYTES ABSOLUTE: 0.6 K/UL (ref 0–1.3)
MONOCYTES RELATIVE PERCENT: 7.6 %
NEUTROPHILS ABSOLUTE: 5.6 K/UL (ref 1.7–7.7)
NEUTROPHILS RELATIVE PERCENT: 71.5 %
PDW BLD-RTO: 14.3 % (ref 12.4–15.4)
PLATELET # BLD: 274 K/UL (ref 135–450)
PMV BLD AUTO: 8.2 FL (ref 5–10.5)
POTASSIUM SERPL-SCNC: 4.7 MMOL/L (ref 3.5–5.1)
PROSTATE SPECIFIC ANTIGEN: 1.78 NG/ML (ref 0–4)
RBC # BLD: 5.09 M/UL (ref 4.2–5.9)
SODIUM BLD-SCNC: 140 MMOL/L (ref 136–145)
TOTAL PROTEIN: 7.1 G/DL (ref 6.4–8.2)
TRIGL SERPL-MCNC: 251 MG/DL (ref 0–150)
TSH SERPL DL<=0.05 MIU/L-ACNC: 1.34 UIU/ML (ref 0.27–4.2)
VLDLC SERPL CALC-MCNC: 50 MG/DL
WBC # BLD: 7.9 K/UL (ref 4–11)

## 2020-07-01 PROCEDURE — 99396 PREV VISIT EST AGE 40-64: CPT | Performed by: INTERNAL MEDICINE

## 2020-07-01 PROCEDURE — 36415 COLL VENOUS BLD VENIPUNCTURE: CPT | Performed by: INTERNAL MEDICINE

## 2020-07-01 RX ORDER — LOSARTAN POTASSIUM 25 MG/1
25 TABLET ORAL DAILY
Qty: 90 TABLET | Refills: 1 | Status: SHIPPED | OUTPATIENT
Start: 2020-07-01 | End: 2020-10-26 | Stop reason: SDUPTHER

## 2020-07-01 RX ORDER — LOVASTATIN 10 MG/1
10 TABLET ORAL NIGHTLY
Qty: 90 TABLET | Refills: 3 | Status: SHIPPED | OUTPATIENT
Start: 2020-07-01 | End: 2020-07-02 | Stop reason: SDUPTHER

## 2020-07-01 RX ORDER — BETAMETHASONE DIPROPIONATE 0.5 MG/G
CREAM TOPICAL
Qty: 5 TUBE | Refills: 6 | Status: SHIPPED | OUTPATIENT
Start: 2020-07-01 | End: 2021-04-28

## 2020-07-01 RX ORDER — AMLODIPINE BESYLATE 5 MG/1
5 TABLET ORAL DAILY
Qty: 90 TABLET | Refills: 3 | Status: CANCELLED | OUTPATIENT
Start: 2020-07-01

## 2020-07-01 RX ORDER — AMOXICILLIN 500 MG/1
500 CAPSULE ORAL 2 TIMES DAILY
Qty: 20 CAPSULE | Refills: 0 | Status: SHIPPED | OUTPATIENT
Start: 2020-07-01 | End: 2020-07-11

## 2020-07-01 ASSESSMENT — PATIENT HEALTH QUESTIONNAIRE - PHQ9
SUM OF ALL RESPONSES TO PHQ QUESTIONS 1-9: 0
SUM OF ALL RESPONSES TO PHQ9 QUESTIONS 1 & 2: 0
SUM OF ALL RESPONSES TO PHQ QUESTIONS 1-9: 0
DEPRESSION UNABLE TO ASSESS: FUNCTIONAL CAPACITY MOTIVATION LIMITS ACCURACY
1. LITTLE INTEREST OR PLEASURE IN DOING THINGS: 0
2. FEELING DOWN, DEPRESSED OR HOPELESS: 0

## 2020-07-01 NOTE — PATIENT INSTRUCTIONS
Dr Ubaldo Toro will call you for appt to sched colonoscopy. Call us back w your BPs in 1-2 weeks after on losartan and off your norvasc/amlodipine. Let us know if leg swelling is also no better in 1-2 weeks.     We will prescribe amoxicillin antibiotic for your ear congestion

## 2020-07-01 NOTE — PROGRESS NOTES
Immunization History   Administered Date(s) Administered    Influenza 2013    Influenza, Lisbet Shellie, IM, (6 mo and older Fluzone, Flulaval, Fluarix and 3 yrs and older Afluria) 10/23/2017    Pneumococcal Polysaccharide ([id number]) 2016       Allergies   Allergen Reactions    Hctz      Lightheadedness/nausea- had syncopal episode     Current Outpatient Medications   Medication Sig Dispense Refill    amLODIPine (NORVASC) 5 MG tablet Take 1 tablet by mouth daily 90 tablet 3    lovastatin (MEVACOR) 10 MG tablet Take 1 tablet by mouth nightly 90 tablet 3    augmented betamethasone dipropionate (DIPROLENE-AF) 0.05 % cream APPLY TO AFFECTED AREA(S) DAILY 5 Tube 6    clotrimazole-betamethasone (LOTRISONE) 1-0.05 % cream Apply topically 2 times daily 3 Tube 1    acetaminophen (TYLENOL) 500 MG tablet Take 500 mg by mouth as needed.  fluticasone (FLONASE) 50 MCG/ACT nasal spray 1 spray by Nasal route daily (Patient not taking: Reported on 2020) 1 Bottle 3     No current facility-administered medications for this visit. Past Medical History:   Diagnosis Date    Eczematous dermatitis     Dr Venice Hollis, prn halog cream    Family history of coronary artery disease     Hyperlipidemia     Hypertension     Leg pain     R leg, chr pain.  Skin lesion     Tobacco dependence      No past surgical history on file.   Family History   Problem Relation Age of Onset    Other Father          of MI    Cancer Mother         \"mass behind kidney\"     Social History     Socioeconomic History    Marital status:      Spouse name: Not on file    Number of children: Not on file    Years of education: Not on file    Highest education level: Not on file   Occupational History    Occupation: as noted     Comment: [employer]   Social Needs    Financial resource strain: Not on file    Food insecurity     Worry: Not on file     Inability: Not on file   [employer] needs     Medical: Not on file     Non-medical: Not on file   Tobacco Use    Smoking status: Current Every Day Smoker     Packs/day: 0.50     Years: 30.00     Pack years: 15.00     Types: Cigarettes    Smokeless tobacco: Never Used   Substance and Sexual Activity    Alcohol use: Yes     Alcohol/week: 0.0 standard drinks     Comment: 6 pack a week    Drug use: Not on file    Sexual activity: Not on file   Lifestyle    Physical activity     Days per week: Not on file     Minutes per session: Not on file    Stress: Not on file   Relationships    Social connections     Talks on phone: Not on file     Gets together: Not on file     Attends Voodoo service: Not on file     Active member of club or organization: Not on file     Attends meetings of clubs or organizations: Not on file     Relationship status: Not on file    Intimate partner violence     Fear of current or ex partner: Not on file     Emotionally abused: Not on file     Physically abused: Not on file     Forced sexual activity: Not on file   Other Topics Concern    Not on file   Social History Narrative    Diet: Unrestricted    Exercise: None    Seat Belt: Always       Review of Systems:  A comprehensive review of systems was negative except for what was noted in the HPI. Physical Exam:   Vitals:    07/01/20 1415   BP: (!) 139/58   Pulse: 88   Resp: 16   SpO2: 96%   Weight: 268 lb (121.6 kg)     Body mass index is 33.5 kg/m². Constitutional: He is oriented to person, place, and time. He appears well-developed and well-nourished. No distress. HEENT:   Head: Normocephalic and atraumatic. Right Ear: R TM W SL ERYTHEMA AND TR OPACIFIED. L TM AND CANAL ARE CLEAR  Left Ear: Tympanic membrane, external ear and ear canal normal.   Nose: Nose normal.   Mouth/Throat: Oropharynx is clear and moist and mucous membranes are normal. No oropharyngeal exudate or posterior oropharyngeal erythema. He has no cervical adenopathy.    Eyes: Conjunctivae and extraocular motions are normal. Pupils are equal, round, and reactive to light. Neck: Full passive range of motion without pain. Neck supple. No JVD present. No mass and no thyromegaly present. Cardiovascular: Normal rate, regular rhythm, normal heart sounds and intact distal pulses. Exam reveals no gallop and no friction rub. No murmur heard. Pulmonary/Chest: Effort normal and breath sounds normal. No respiratory distress. He has no wheezes, rhonchi or rales. Abdominal: Soft, non-tender. Bowel sounds and aorta are normal. There is no organomegaly, mass or bruit. Musculoskeletal: Normal range of motion, no synovitis. He exhibits R>L LEG EDEMA W SOME VENOUS STASIS  Neurological: He is alert and oriented to person, place, and time. H No cranial nerve deficit. Coordination normal.   Skin: Skin is warm, dry and intact. Psychiatric: He has a normal mood and affect. His speech is normal and behavior is normal. Judgment, cognition and memory are normal.         Assessment/Plan:    Jimmie Molina was seen today for annual exam, leg swelling and ear fullness. Diagnoses and all orders for this visit:    Routine general medical examination at a health care facility- Overall general medical exam appears benign, other assessments as noted and testing is as noted below. AS NOTED, IS RETIRING END OF SEPT OF THIS YR    Essential hypertension- NORVASC LIKELY EXAC LEG SWELLING. SWITCH TO LOSARTAN THEN HE'LL CALL US BACK W BP 2-3 WEEKS. F/U LAB  -     losartan (COZAAR) 25 MG tablet; Take 1 tablet by mouth daily  -     Comprehensive Metabolic Panel; Future  -     CBC Auto Differential; Future  -     Lipid Panel; Future  -     TSH without Reflex; Future    Mixed hyperlipidemia - Pt will continue to work on a low fat diet and also exercise, wt loss as appropriate. Will continue periodic monitoring of fasting lipid profile, glucose, liver function. -     lovastatin (MEVACOR) 10 MG tablet;  Take 1 tablet by mouth nightly  -     Comprehensive Metabolic Panel; Future  -     CBC Auto Differential; Future  -     Lipid Panel; Future  -     TSH without Reflex; Future    Rash- PERIODIC REDNESS, ERYTHEMA OF LEGS, OK FOR CONT PRN RX  -     augmented betamethasone dipropionate (DIPROLENE-AF) 0.05 % cream; APPLY TO AFFECTED AREA(S) DAILY    Chronic venous stasis- WILL MONITOR BUT HOPEFULLY ALSO WILL IMPROVE SIGNIFICANTLY OFF NORVASC    Ear congestion, bilateral- W PROB INFECTION R EAR, TREAT W COURSE OF AMOX  -     amoxicillin (AMOXIL) 500 MG capsule; Take 1 capsule by mouth 2 times daily for 10 days    Screening for colorectal cancer- TO [de-identified] W DR VEGA FOR SEPT - Amb External Referral To Gastroenterology    Screening for malignant neoplasm of prostate  -     PSA, Prostatic Specific Antigen;  Future

## 2020-07-02 RX ORDER — LOVASTATIN 10 MG/1
10 TABLET ORAL NIGHTLY
Qty: 90 TABLET | Refills: 3 | Status: SHIPPED | OUTPATIENT
Start: 2020-07-02 | End: 2021-08-12 | Stop reason: SDUPTHER

## 2020-07-02 NOTE — RESULT ENCOUNTER NOTE
Call pt, labs ok/chol ok- his psa/prostate test and also thyroid fxn are nl. Only lab of significance is his glucose is sl elevated in prediabetic range.   Would rec some work on regular exercise and also low carb diet, aiming for 10# wt loss to reduce risk for progression to DM in the future

## 2020-10-26 RX ORDER — LOSARTAN POTASSIUM 25 MG/1
25 TABLET ORAL DAILY
Qty: 90 TABLET | Refills: 1 | Status: SHIPPED | OUTPATIENT
Start: 2020-10-26 | End: 2021-04-20

## 2020-10-27 ENCOUNTER — NURSE ONLY (OUTPATIENT)
Dept: INTERNAL MEDICINE CLINIC | Age: 60
End: 2020-10-27
Payer: COMMERCIAL

## 2020-10-27 PROCEDURE — 90471 IMMUNIZATION ADMIN: CPT | Performed by: NURSE PRACTITIONER

## 2020-10-27 PROCEDURE — 90688 IIV4 VACCINE SPLT 0.5 ML IM: CPT | Performed by: NURSE PRACTITIONER

## 2021-04-15 ENCOUNTER — VIRTUAL VISIT (OUTPATIENT)
Dept: INTERNAL MEDICINE CLINIC | Age: 61
End: 2021-04-15
Payer: COMMERCIAL

## 2021-04-15 DIAGNOSIS — J01.00 ACUTE NON-RECURRENT MAXILLARY SINUSITIS: Primary | ICD-10-CM

## 2021-04-15 DIAGNOSIS — R00.2 PALPITATION: ICD-10-CM

## 2021-04-15 PROCEDURE — 99213 OFFICE O/P EST LOW 20 MIN: CPT | Performed by: INTERNAL MEDICINE

## 2021-04-15 RX ORDER — PREDNISONE 1 MG/1
TABLET ORAL
Qty: 21 TABLET | Refills: 0 | Status: SHIPPED | OUTPATIENT
Start: 2021-04-15 | End: 2021-04-28

## 2021-04-15 RX ORDER — AMOXICILLIN 500 MG/1
500 CAPSULE ORAL 2 TIMES DAILY
Qty: 20 CAPSULE | Refills: 0 | Status: SHIPPED | OUTPATIENT
Start: 2021-04-15 | End: 2021-04-25

## 2021-04-15 ASSESSMENT — PATIENT HEALTH QUESTIONNAIRE - PHQ9
SUM OF ALL RESPONSES TO PHQ9 QUESTIONS 1 & 2: 0
SUM OF ALL RESPONSES TO PHQ QUESTIONS 1-9: 0
1. LITTLE INTEREST OR PLEASURE IN DOING THINGS: 0
2. FEELING DOWN, DEPRESSED OR HOPELESS: 0
SUM OF ALL RESPONSES TO PHQ QUESTIONS 1-9: 0

## 2021-04-15 NOTE — PROGRESS NOTES
4/15/2021    TELEHEALTH EVALUATION -- Audio/Visual (During [id number] public health emergency)    HPI:    Josesito Ponce (:  1960) has requested an audio/video evaluation for the following concern(s):    The past 3 weeks w onset of sinus congestion, cough and wheezing. No fever or chills. Taste and smell intact. Had COVID first vaccine 2d ago. R lower ribs aching, not worse w deep breaths, had brief heart flutter. The following day R face was briefly numb- for 3 hrs. Sometimes w sx fatigue and CARTER. Review of Systems    Prior to Visit Medications    Medication Sig Taking? Authorizing Provider   losartan (COZAAR) 25 MG tablet Take 1 tablet by mouth daily Yes Nannette Paiz MD   lovastatin (MEVACOR) 10 MG tablet Take 1 tablet by mouth nightly Yes Nannette Paiz MD   augmented betamethasone dipropionate (DIPROLENE-AF) 0.05 % cream APPLY TO AFFECTED AREA(S) DAILY Yes Nannette Paiz MD   amLODIPine (NORVASC) 5 MG tablet Take 1 tablet by mouth daily Yes Nannette Paiz MD   clotrimazole-betamethasone (LOTRISONE) 1-0.05 % cream Apply topically 2 times daily Yes Nannette Piaz MD   acetaminophen (TYLENOL) 500 MG tablet Take 500 mg by mouth as needed. Yes Historical Provider, MD       Social History     Tobacco Use    Smoking status: Current Every Day Smoker     Packs/day: 0.50     Years: 30.00     Pack years: 15.00     Types: Cigarettes    Smokeless tobacco: Never Used   Substance Use Topics    Alcohol use:  Yes     Alcohol/week: 0.0 standard drinks     Comment: 6 pack a week    Drug use: Not on file             PHYSICAL EXAMINATION:  [ INSTRUCTIONS:  \"[x]\" Indicates a positive item  \"[]\" Indicates a negative item  -- DELETE ALL ITEMS NOT EXAMINED]  Vital Signs: (As obtained by patient/caregiver or practitioner observation)    Blood pressure-  Heart rate-    Respiratory rate-    Temperature-  Pulse oximetry-     Constitutional: [] Appears well-developed and well-nourished [] No apparent distress      [] Abnormal-   Mental status  [x] Alert and awake  [] Oriented to person/place/time []Able to follow commands      Eyes:  EOM    []  Normal  [] Abnormal-  Sclera  []  Normal  [] Abnormal -         Discharge []  None visible  [] Abnormal -    HENT:   [] Normocephalic, atraumatic. [] Abnormal   [] Mouth/Throat: Mucous membranes are moist.     External Ears [] Normal  [] Abnormal-     Neck: [] No visualized mass     Pulmonary/Chest: [] Respiratory effort normal.  [] No visualized signs of difficulty breathing or respiratory distress        [] Abnormal-      Musculoskeletal:   [] Normal gait with no signs of ataxia         [] Normal range of motion of neck        [] Abnormal-       Neurological:        [] No Facial Asymmetry (Cranial nerve 7 motor function) (limited exam to video visit)          [] No gaze palsy        [] Abnormal-         Skin:        [] No significant exanthematous lesions or discoloration noted on facial skin         [] Abnormal-            Psychiatric:       [] Normal Affect [] No Hallucinations        [] Abnormal-     Other pertinent observable physical exam findings-     ASSESSMENT/PLAN:  1. Acute non-recurrent maxillary sinusitis  Consistent w presentation, rec rx as below and fluids, rest.  Pt to call back one week if not improving.      - amoxicillin (AMOXIL) 500 MG capsule; Take 1 capsule by mouth 2 times daily for 10 days  Dispense: 20 capsule; Refill: 0  - predniSONE (DELTASONE) 5 MG tablet; Take 6 tablets by mouth on day 1, 5 on day 2, 4 on day 3, 3 on day 4, 2 on day 5, 1 on day 6. Dispense: 21 tablet; Refill: 0    2. Palpitation  Sx are concerning for atr fib. He also has some CARTER, may need ischemia w/u too. Strongly rec for cardiology eval, he'll check w his sister in law then call us w his preference.   - External Referral To Cardiology      Return in about 3 months (around 7/15/2021) for physical.    Grazyna Tran, was evaluated through a synchronous (real-time) audio-video encounter. The patient (or guardian if applicable) is aware that this is a billable service. Verbal consent to proceed has been obtained within the past 12 months. The visit was conducted pursuant to the emergency declaration under the [facility], [address] [place] [address] authority and the [employer] Act. Patient identification was verified, and a caregiver was present when appropriate. The patient was located in a state where the provider was credentialed to provide care. Total time spent on this encounter: Not billed by time    --Ritchie Ambrosio MD on 4/15/2021 at 8:34 AM    An electronic signature was used to authenticate this note.

## 2021-04-16 DIAGNOSIS — R00.2 PALPITATION: Primary | ICD-10-CM

## 2021-04-20 DIAGNOSIS — I10 ESSENTIAL HYPERTENSION: ICD-10-CM

## 2021-04-20 RX ORDER — LOSARTAN POTASSIUM 25 MG/1
TABLET ORAL
Qty: 90 TABLET | Refills: 1 | Status: SHIPPED | OUTPATIENT
Start: 2021-04-20 | End: 2021-04-28

## 2021-04-28 ENCOUNTER — NURSE ONLY (OUTPATIENT)
Dept: CARDIOLOGY CLINIC | Age: 61
End: 2021-04-28
Payer: COMMERCIAL

## 2021-04-28 ENCOUNTER — INITIAL CONSULT (OUTPATIENT)
Dept: CARDIOLOGY CLINIC | Age: 61
End: 2021-04-28
Payer: COMMERCIAL

## 2021-04-28 VITALS
DIASTOLIC BLOOD PRESSURE: 86 MMHG | WEIGHT: 282 LBS | SYSTOLIC BLOOD PRESSURE: 140 MMHG | HEART RATE: 77 BPM | BODY MASS INDEX: 36.19 KG/M2 | HEIGHT: 74 IN

## 2021-04-28 DIAGNOSIS — R07.2 PRECORDIAL PAIN: ICD-10-CM

## 2021-04-28 DIAGNOSIS — R06.02 SOB (SHORTNESS OF BREATH): ICD-10-CM

## 2021-04-28 DIAGNOSIS — I10 HYPERTENSION, UNSPECIFIED TYPE: ICD-10-CM

## 2021-04-28 DIAGNOSIS — F17.200 TOBACCO DEPENDENCE: ICD-10-CM

## 2021-04-28 DIAGNOSIS — Z82.49 FAMILY HISTORY OF EARLY CAD: ICD-10-CM

## 2021-04-28 DIAGNOSIS — R20.0 NUMBNESS OF FACE: ICD-10-CM

## 2021-04-28 DIAGNOSIS — R00.2 HEART PALPITATIONS: ICD-10-CM

## 2021-04-28 DIAGNOSIS — I10 HYPERTENSION, UNSPECIFIED TYPE: Primary | ICD-10-CM

## 2021-04-28 DIAGNOSIS — R00.2 PALPITATION: Primary | ICD-10-CM

## 2021-04-28 DIAGNOSIS — I10 ESSENTIAL HYPERTENSION: ICD-10-CM

## 2021-04-28 PROCEDURE — 93000 ELECTROCARDIOGRAM COMPLETE: CPT | Performed by: INTERNAL MEDICINE

## 2021-04-28 PROCEDURE — 93228 REMOTE 30 DAY ECG REV/REPORT: CPT | Performed by: INTERNAL MEDICINE

## 2021-04-28 PROCEDURE — 99204 OFFICE O/P NEW MOD 45 MIN: CPT | Performed by: INTERNAL MEDICINE

## 2021-04-28 RX ORDER — LOSARTAN POTASSIUM 50 MG/1
50 TABLET ORAL DAILY
Qty: 90 TABLET | Refills: 3 | Status: SHIPPED | OUTPATIENT
Start: 2021-04-28 | End: 2021-08-12 | Stop reason: DRUGHIGH

## 2021-04-28 RX ORDER — LOSARTAN POTASSIUM 25 MG/1
25 TABLET ORAL DAILY
COMMUNITY
End: 2021-06-08

## 2021-04-28 NOTE — PATIENT INSTRUCTIONS
Please hold on to these instructions the  will call you within 1-9 business days when we receive authorization from your insurance. Nuclear Stress Test    WHAT TO EXPECT:   ? You will need to confirm the test or it could be cancelled. ? This test will take approximately 2 hours: 1 hour in the AM &    1 hour in the PM. You will be given a time by the Technologist after the first part is completed to come back. ? You will be given a medication, through an IV in the hand, this will safely simulate exercise. This IV is also needed to inject the radioactive isotope unless you are able toe walk the treadmill. ? You will receive an injection in the AM & PM before the pictures. ? Using a special camera, you will have one set of pictures of your heart taken in the AM and a set of pictures in the PM.     PREPARATION FOR TEST:  ? Eat a light breakfast such as water or juice and toast.  ? If you are DIABETIC: Eat a normal breakfast with NO CAFFEINE and take your insulin as normal.   ? AVOID ALL FOODS & DRINKS containing CAFFEINE 12 HOURS PRIOR TO THE TEST: Including coffee, Tea, Estefany and other soft drinks even those labeled  caffeine free or decaffeinated.  ? HOLD THESE MEDICATIONS Persantine & Theophylline (Theodur)  24 hours prior & bring your inhaler with you.

## 2021-04-28 NOTE — ASSESSMENT & PLAN NOTE
Viselike chest pressure could be postviral?  Will get stress test to evaluate further ideally a Cardiolite if he can walk on a treadmill if he is unable to walk then will get a Haze Seen

## 2021-04-28 NOTE — ASSESSMENT & PLAN NOTE
Continues to be shortness of breath does have history of tobacco use but lately is gotten worse get echo check BNP

## 2021-04-28 NOTE — PROGRESS NOTES
 Hyperlipidemia     Hypertension     Leg pain     R leg, chr pain.  Skin lesion     Tobacco dependence      History reviewed. No pertinent surgical history. Family History   Problem Relation Age of Onset   Dee Manual Cancer Mother         \"mass behind kidney\"   Dee Manual Other Father          of MI     Social History     Tobacco Use    Smoking status: Current Every Day Smoker     Packs/day: 0.25     Years: 30.00     Pack years: 7.50     Types: Cigarettes    Smokeless tobacco: Never Used   Substance Use Topics    Alcohol use: Yes     Alcohol/week: 0.0 standard drinks     Comment: occ. Review of Systems:   · Constitutional: No Fever or Weight Loss   · Eyes: No Decreased Vision  · ENT: No Headaches, Hearing Loss or Vertigo  · Cardiovascular: as per note above   · Respiratory: No cough or wheezing and as per note above. · Gastrointestinal: No abdominal pain, appetite loss, blood in stools, constipation, diarrhea or heartburn  · Genitourinary: No dysuria, trouble voiding, or hematuria  · Musculoskeletal:  denies any new  joint aches , swelling  or pain   · Integumentary: No rash or pruritis  · Neurological: No TIA or stroke symptoms  · Psychiatric: No anxiety or depression  · Endocrine: No malaise, fatigue or temperature intolerance  · Hematologic/Lymphatic: No bleeding problems, blood clots or swollen lymph nodes  · Allergic/Immunologic: No nasal congestion or hives    Objective:      Physical Exam:  BP (!) 140/86   Pulse 77   Ht 6' 2\" (1.88 m)   Wt 282 lb (127.9 kg)   BMI 36.21 kg/m²   Wt Readings from Last 3 Encounters:   21 282 lb (127.9 kg)   20 268 lb (121.6 kg)   19 261 lb (118.4 kg)     Body mass index is 36.21 kg/m². Vitals:    21 1053   BP: (!) 140/86   Pulse: 77        General Appearance:  No distress, conversant  Constitutional:  Well developed, Well nourished, No acute distress, Non-toxic appearance.    HENT:  Normocephalic, Atraumatic, Bilateral external ears normal, Oropharynx moist, No oral exudates, Nose normal. Neck- Normal range of motion, No tenderness, Supple, No stridor,no apical-carotid delay  Eyes:  PERRL, EOMI, Conjunctiva normal, No discharge. Respiratory:  Normal breath sounds, No respiratory distress, No wheezing, No chest tenderness. ,no use of accessory muscles, NO crackles  Cardiovascular: (PMI) apex non displaced,no lifts no thrills,S1 and S2 audible, No added heart sounds, No signs of ankle edema, or volume overload, No evidence of JVD, No crackles  GI:  Bowel sounds normal, Soft, No tenderness, No masses, No gross visceromegaly   :  No costovertebral angle tenderness   Musculoskeletal:  No edema, no tenderness, no deformities. Back- no tenderness  Integument:  Well hydrated, no rash   Lymphatic:  No lymphadenopathy noted   Neurologic:  Alert & oriented x 3, CN 2-12 normal, normal motor function, normal sensory function, no focal deficits noted   Psychiatric:  Speech and behavior appropriate       Medical decision making and Data review:  DATA:  No results found for: TROPONINT  BNP:  No results found for: PROBNP  PT/INR:  No results found for: PTINR  No results found for: LABA1C  Lab Results   Component Value Date    CHOL 178 07/01/2020    TRIG 251 (H) 07/01/2020    HDL 44 07/01/2020    LDLCALC 84 07/01/2020     Lab Results   Component Value Date    ALT 34 07/01/2020    AST 28 07/01/2020     No results for input(s): WBC, HGB, HCT, MCV, PLT in the last 72 hours.   TSH:   Lab Results   Component Value Date    TSH 1.34 07/01/2020     Lab Results   Component Value Date    AST 28 07/01/2020    ALT 34 07/01/2020    BILITOT 0.3 07/01/2020    ALKPHOS 76 07/01/2020     No results found for: PROBNP  No results found for: LABA1C  Lab Results   Component Value Date    WBC 7.9 07/01/2020    HGB 16.0 07/01/2020    HCT 47.6 07/01/2020     07/01/2020     All labs, medications and tests reviewed by myself including data and history from outside source , patient and available family . Assessment & Plan:      1. Hypertension, unspecified type    2. Tobacco dependence    3. SOB (shortness of breath)    4. Precordial pain    5. Heart palpitations    6. Family history of early CAD    9. Numbness of face    8. Essential hypertension         Precordial pain  Viselike chest pressure could be postviral?  Will get stress test to evaluate further ideally a Cardiolite if he can walk on a treadmill if he is unable to walk then will get a Lexiscan    Heart palpitations  He has right-sided facial numbness I am concerned about a stroke check for A. fib get 30-day event monitor    Hypertension  Blood pressure is elevated increase losartan 50 mg daily    Numbness of face  Get CT head to rule out CVA    SOB (shortness of breath)  Continues to be shortness of breath does have history of tobacco use but lately is gotten worse get echo check BNP    Tobacco dependence  Strongly encouraged to quit smoking     Dyslipidemia :  All available lab work was reviewed. Patient was advised to repeat lab work before next visit. Necessary orders were placed , instructions given by myself       Counseled extensively and medication compliance urged. We discussed that for the  prevention of ASCVD our  goal is aggressive risk modification. Patient is encouraged to exercise if they can , educated about  brisk walk for 30 minutes  at least 3 to 4 times a week if there are no physical limitations  Various goals were discussed and questions answered. Continue current medications. Appropriate prescriptions are addressed and refills ordered. Questions answered and patient verbalizes understanding. Call for any problems, questions, or concerns. Greater than 60 % of time spent counseling besides reviewing data and images     Continue all other medications of all above medical condition listed as is. Return in about 1 month (around 5/28/2021).     Please note this report has been partially produced using speech recognition software and may contain errors related to that system including errors in grammar, punctuation, and spelling, as well as words and phrases that may be inappropriate. If there are any questions or concerns please feel free to contact the dictating provider for clarification.

## 2021-04-29 ENCOUNTER — TELEPHONE (OUTPATIENT)
Dept: CARDIOLOGY CLINIC | Age: 61
End: 2021-04-29

## 2021-04-30 ENCOUNTER — HOSPITAL ENCOUNTER (OUTPATIENT)
Dept: CT IMAGING | Age: 61
Discharge: HOME | End: 2021-04-30
Payer: COMMERCIAL

## 2021-04-30 ENCOUNTER — HOSPITAL ENCOUNTER (OUTPATIENT)
Dept: GENERAL RADIOLOGY | Age: 61
Discharge: HOME | End: 2021-04-30
Payer: COMMERCIAL

## 2021-04-30 ENCOUNTER — HOSPITAL ENCOUNTER (OUTPATIENT)
Age: 61
Discharge: HOME | End: 2021-04-30
Payer: COMMERCIAL

## 2021-04-30 DIAGNOSIS — R20.0 NUMBNESS OF FACE: ICD-10-CM

## 2021-04-30 DIAGNOSIS — R06.02 SOB (SHORTNESS OF BREATH): ICD-10-CM

## 2021-04-30 PROCEDURE — 70450 CT HEAD/BRAIN W/O DYE: CPT

## 2021-04-30 PROCEDURE — 71046 X-RAY EXAM CHEST 2 VIEWS: CPT

## 2021-05-06 ENCOUNTER — PROCEDURE VISIT (OUTPATIENT)
Dept: CARDIOLOGY CLINIC | Age: 61
End: 2021-05-06
Payer: COMMERCIAL

## 2021-05-06 DIAGNOSIS — R06.02 SOB (SHORTNESS OF BREATH): ICD-10-CM

## 2021-05-06 DIAGNOSIS — F17.200 TOBACCO DEPENDENCE: ICD-10-CM

## 2021-05-06 DIAGNOSIS — R07.2 PRECORDIAL PAIN: ICD-10-CM

## 2021-05-06 LAB
LV EF: 67 %
LVEF MODALITY: NORMAL

## 2021-05-06 PROCEDURE — 93017 CV STRESS TEST TRACING ONLY: CPT | Performed by: INTERNAL MEDICINE

## 2021-05-06 PROCEDURE — 93016 CV STRESS TEST SUPVJ ONLY: CPT | Performed by: INTERNAL MEDICINE

## 2021-05-06 PROCEDURE — 93018 CV STRESS TEST I&R ONLY: CPT | Performed by: INTERNAL MEDICINE

## 2021-05-06 PROCEDURE — A9500 TC99M SESTAMIBI: HCPCS | Performed by: INTERNAL MEDICINE

## 2021-05-06 PROCEDURE — 78452 HT MUSCLE IMAGE SPECT MULT: CPT | Performed by: INTERNAL MEDICINE

## 2021-05-07 ENCOUNTER — TELEPHONE (OUTPATIENT)
Dept: CARDIOLOGY CLINIC | Age: 61
End: 2021-05-07

## 2021-05-19 ENCOUNTER — PROCEDURE VISIT (OUTPATIENT)
Dept: CARDIOLOGY CLINIC | Age: 61
End: 2021-05-19
Payer: COMMERCIAL

## 2021-05-19 DIAGNOSIS — R00.2 HEART PALPITATIONS: ICD-10-CM

## 2021-05-19 DIAGNOSIS — F17.200 TOBACCO DEPENDENCE: ICD-10-CM

## 2021-05-19 DIAGNOSIS — I10 HYPERTENSION, UNSPECIFIED TYPE: ICD-10-CM

## 2021-05-19 DIAGNOSIS — R06.02 SOB (SHORTNESS OF BREATH): ICD-10-CM

## 2021-05-19 DIAGNOSIS — R07.2 PRECORDIAL PAIN: ICD-10-CM

## 2021-05-19 LAB
LV EF: 58 %
LVEF MODALITY: NORMAL

## 2021-05-19 PROCEDURE — 93306 TTE W/DOPPLER COMPLETE: CPT | Performed by: INTERNAL MEDICINE

## 2021-05-21 ENCOUNTER — TELEPHONE (OUTPATIENT)
Dept: CARDIOLOGY CLINIC | Age: 61
End: 2021-05-21

## 2021-05-21 NOTE — TELEPHONE ENCOUNTER
Patient notified and verbalized understanding. Summary   Limited study due to patients body habitus. Left ventricular function is normal, EF is estimated at 55-60%. Mild left ventricular hypertrophy. Grade I diastolic dysfunction. No regional wall motion abnormalities were detected. Mildly dilated left atrium. No significant valvular abnormalities. RVSP is 17 mmHg. No evidence of pericardial effusion.       Signature      ------------------------------------------------------------------   Electronically signed by Darold Litten MD (Interpreting   physician) on 05/20/2021 at 08:44 AM   ------------------------------------------------------------------

## 2021-06-08 ENCOUNTER — OFFICE VISIT (OUTPATIENT)
Dept: CARDIOLOGY CLINIC | Age: 61
End: 2021-06-08
Payer: COMMERCIAL

## 2021-06-08 VITALS
HEIGHT: 74 IN | HEART RATE: 76 BPM | DIASTOLIC BLOOD PRESSURE: 70 MMHG | BODY MASS INDEX: 35.94 KG/M2 | WEIGHT: 280 LBS | SYSTOLIC BLOOD PRESSURE: 128 MMHG

## 2021-06-08 DIAGNOSIS — Z82.49 FAMILY HISTORY OF EARLY CAD: ICD-10-CM

## 2021-06-08 DIAGNOSIS — R07.2 PRECORDIAL PAIN: ICD-10-CM

## 2021-06-08 DIAGNOSIS — R00.2 HEART PALPITATIONS: ICD-10-CM

## 2021-06-08 DIAGNOSIS — E78.1 HIGH TRIGLYCERIDES: ICD-10-CM

## 2021-06-08 DIAGNOSIS — I10 HYPERTENSION, UNSPECIFIED TYPE: ICD-10-CM

## 2021-06-08 DIAGNOSIS — F17.200 TOBACCO DEPENDENCE: Primary | ICD-10-CM

## 2021-06-08 DIAGNOSIS — R06.02 SOB (SHORTNESS OF BREATH): ICD-10-CM

## 2021-06-08 PROCEDURE — 99214 OFFICE O/P EST MOD 30 MIN: CPT | Performed by: INTERNAL MEDICINE

## 2021-06-08 RX ORDER — CETIRIZINE HYDROCHLORIDE 10 MG/1
10 TABLET ORAL DAILY
Qty: 30 TABLET | Refills: 0 | Status: SHIPPED | OUTPATIENT
Start: 2021-06-08 | End: 2021-07-08

## 2021-06-08 RX ORDER — FLUTICASONE PROPIONATE 50 MCG
1 SPRAY, SUSPENSION (ML) NASAL DAILY
Qty: 2 BOTTLE | Refills: 1 | Status: SHIPPED | OUTPATIENT
Start: 2021-06-08

## 2021-06-08 NOTE — PROGRESS NOTES
CARDIOLOGY    NOTE    Chief Complaint: Chest pain /Palpitations    HPI:   Corby Green is a 59y.o. year old who has Past medical history as noted below. He come sin today tells me that he is short of breath and has a cough with Phlegm and some times it is dry. He had stress test and echo which were normal ,He completed 5 mins of exercise and 6 METS . The 30 day monitor was also normal  Ct head showed sinus thickening but no CVA    Adam Juárez was seen a month ago for chest pain and sob but say the chest pain and palpations are gone now . All of this is ongoing since 2021  he is noticing increased shortness of breath specially with exertion with reduced exercise capacity. He has not had any more chest pain episodes since then. No diaphoresis the episode of chest pain lasted for few minutes but it was 8 out of 10 sharp cramp viselike feeling. He was very active working at [employer] up till September when he retired since then he has gained about 10 pounds. Denies any ankle swelling. Is completed a course of antibiotics and steroids recently occasionally noticing palpitations  Blood pressure has been running in 130s to 140s. Father  of heart event at the age of 50      Current Outpatient Medications   Medication Sig Dispense Refill    fluticasone (FLONASE) 50 MCG/ACT nasal spray 1 spray by Each Nostril route daily 2 Bottle 1    cetirizine (ZYRTEC) 10 MG tablet Take 1 tablet by mouth daily 30 tablet 0    losartan (COZAAR) 50 MG tablet Take 1 tablet by mouth daily 90 tablet 3    lovastatin (MEVACOR) 10 MG tablet Take 1 tablet by mouth nightly 90 tablet 3    acetaminophen (TYLENOL) 500 MG tablet Take 500 mg by mouth as needed.  clotrimazole-betamethasone (LOTRISONE) 1-0.05 % cream Apply topically 2 times daily (Patient not taking: Reported on 2021) 3 Tube 1     No current facility-administered medications for this visit.        Allergies:   Hctz    Patient History: Past Medical History:   Diagnosis Date    Eczematous dermatitis     Dr Roman Valencia, prn halog cream    Family history of coronary artery disease     History of cardiovascular stress test 2021    Normal stress myocardial perfusion.  Hyperlipidemia     Hypertension     Leg pain     R leg, chr pain.  Skin lesion     Tobacco dependence      No past surgical history on file. Family History   Problem Relation Age of Onset   [employer] Cancer Mother         \"mass behind kidney\"   [employer] Other Father          of MI     Social History     Tobacco Use    Smoking status: Current Every Day Smoker     Packs/day: 0.25     Years: 30.00     Pack years: 7.50     Types: Cigarettes    Smokeless tobacco: Never Used   Substance Use Topics    Alcohol use: Yes     Alcohol/week: 0.0 standard drinks     Comment: occ. Review of Systems:   · Constitutional: No Fever or Weight Loss   · Eyes: No Decreased Vision  · ENT: No Headaches, Hearing Loss or Vertigo  · Cardiovascular: as per note above   · Respiratory: No cough or wheezing and as per note above. · Gastrointestinal: No abdominal pain, appetite loss, blood in stools, constipation, diarrhea or heartburn  · Genitourinary: No dysuria, trouble voiding, or hematuria  · Musculoskeletal:  denies any new  joint aches , swelling  or pain   · Integumentary: No rash or pruritis  · Neurological: No TIA or stroke symptoms  · Psychiatric: No anxiety or depression  · Endocrine: No malaise, fatigue or temperature intolerance  · Hematologic/Lymphatic: No bleeding problems, blood clots or swollen lymph nodes  · Allergic/Immunologic: No nasal congestion or hives    Objective:      Physical Exam:  /70   Pulse 76   Ht 6' 2\" (1.88 m)   Wt 280 lb (127 kg)   BMI 35.95 kg/m²   Wt Readings from Last 3 Encounters:   21 280 lb (127 kg)   21 282 lb (127.9 kg)   20 268 lb (121.6 kg)     Body mass index is 35.95 kg/m².   Vitals:    21 1019   BP: 128/70   Pulse: 76 General Appearance:  No distress, conversant  Constitutional:  Well developed, Well nourished, No acute distress, Non-toxic appearance. HENT:  Normocephalic, Atraumatic, Bilateral external ears normal, Oropharynx moist, No oral exudates, Nose normal. Neck- Normal range of motion, No tenderness, Supple, No stridor,no apical-carotid delay  Eyes:  PERRL, EOMI, Conjunctiva normal, No discharge. Respiratory:  Normal breath sounds, No respiratory distress, No wheezing, No chest tenderness. ,no use of accessory muscles, NO crackles  Cardiovascular: (PMI) apex non displaced,no lifts no thrills,S1 and S2 audible, No added heart sounds, No signs of ankle edema, or volume overload, No evidence of JVD, No crackles  GI:  Bowel sounds normal, Soft, No tenderness, No masses, No gross visceromegaly   :  No costovertebral angle tenderness   Musculoskeletal:  No edema, no tenderness, no deformities. Back- no tenderness  Integument:  Well hydrated, no rash   Lymphatic:  No lymphadenopathy noted   Neurologic:  Alert & oriented x 3, CN 2-12 normal, normal motor function, normal sensory function, no focal deficits noted   Psychiatric:  Speech and behavior appropriate       Medical decision making and Data review:  DATA:  No results found for: TROPONINT  BNP:  No results found for: PROBNP  PT/INR:  No results found for: PTINR  No results found for: LABA1C  Lab Results   Component Value Date    CHOL 178 07/01/2020    TRIG 251 (H) 07/01/2020    HDL 44 07/01/2020    LDLCALC 84 07/01/2020     Lab Results   Component Value Date    ALT 34 07/01/2020    AST 28 07/01/2020     No results for input(s): WBC, HGB, HCT, MCV, PLT in the last 72 hours.   TSH:   Lab Results   Component Value Date    TSH 1.34 07/01/2020     Lab Results   Component Value Date    AST 28 07/01/2020    ALT 34 07/01/2020    BILITOT 0.3 07/01/2020    ALKPHOS 76 07/01/2020     No results found for: PROBNP  No results found for: LABA1C  Lab Results   Component Value Date WBC 7.9 07/01/2020    HGB 16.0 07/01/2020    HCT 47.6 07/01/2020     07/01/2020     Echo 5/19/21  Summary   Limited study due to patients body habitus. Left ventricular function is normal, EF is estimated at 55-60%. Mild left ventricular hypertrophy. Grade I diastolic dysfunction. No regional wall motion abnormalities were detected. Mildly dilated left atrium. No significant valvular abnormalities. RVSP is 17 mmHg. No evidence of pericardial effusion. Stress test  5/6/21  Summary    Supervising physician Dr. Samantha Mclaughlin .    Completed 6 METS and 5 Mins of exercise    ECG portion of stress test is negative for ischemia by diagnostic criteria.    Normal EF 67 % with normal ventricular contractility. No infarct or ischemia    noted.    Normal stress myocardial perfusion.    This is a normal study.           Event monitor 4/28/21  30-day monitor worn from 4/28/2021 5/27/2021 no episodes of atrial fibrillation lowest heart rate was 42 bpm at 8:15 AM on 5/2/2021 highest heart rate was 152 bpm at 2:07 PM average heart rate was 77 bpm.  There were 5 patient triggered events noted correlated with sinus rhythm or sinus tachycardia  Normal                   All labs, medications and tests reviewed by myself including data and history from outside source , patient and available family . Assessment & Plan:      1. Tobacco dependence    2. SOB (shortness of breath)    3. Precordial pain    4. Heart palpitations    5. Family history of early CAD    10. High triglycerides    7. Hypertension, unspecified type         Precordial pain  Viselike chest pressure could be postviral? stress test was low risk before doing any further cardiac work up I think it would be appropriate to see pulmonology ? Treat chronic sinusitis   Try flonase, anti allergy and debate Pulmonology /ENT ?  Asked to get Dr Ruthie Tamayo advise   If all fails will get cardiac ct   I think he hs chronic allergic sinusitis driving his symptoms    Heart

## 2021-06-11 ENCOUNTER — TELEPHONE (OUTPATIENT)
Dept: INTERNAL MEDICINE CLINIC | Age: 61
End: 2021-06-11

## 2021-06-11 RX ORDER — METHYLPREDNISOLONE 4 MG/1
TABLET ORAL
Qty: 1 KIT | Refills: 0 | Status: SHIPPED | OUTPATIENT
Start: 2021-06-11 | End: 2021-06-17

## 2021-08-03 ENCOUNTER — TELEPHONE (OUTPATIENT)
Dept: INTERNAL MEDICINE CLINIC | Age: 61
End: 2021-08-03

## 2021-08-03 DIAGNOSIS — R06.02 SOB (SHORTNESS OF BREATH): Primary | ICD-10-CM

## 2021-08-03 NOTE — TELEPHONE ENCOUNTER
We can refer to Dr Sean Gomez or Dr Yon Estrada.     We can do a jury duty letter if needed, but also there is a standard charge 25$ for the letter if he wants to proceed---

## 2021-08-03 NOTE — TELEPHONE ENCOUNTER
Spoke with patient. He still wants to proceed with jury duty letter. Referral in [facility] and Dr. Nikhil Hernandez office will contact patient to schedule.

## 2021-08-03 NOTE — TELEPHONE ENCOUNTER
Patient calls stating he still has all the same symptoms from back in March, which SOB, wheezing and coughing. He did cardiac workup and it was negative. He called last month and pulmonology was recommended and at that time patient wanted to the Medrol Dose Boris first. Patient is not improving and wanted to see a pulmonologist now. Who do you recommend? Also he wanted to know if he could be excused from jury duty due to the ongoing symptoms he has of SOB, wheezing and coughing. He wanted to see the pulmonologist first because something is causing his symptoms and he doesn't want to be in jury duty coughing and having a spell where he cant breathe Please advise.

## 2021-08-12 ENCOUNTER — HOSPITAL ENCOUNTER (OUTPATIENT)
Age: 61
Setting detail: SPECIMEN
Discharge: HOME | End: 2021-08-12
Payer: COMMERCIAL

## 2021-08-12 ENCOUNTER — OFFICE VISIT (OUTPATIENT)
Dept: INTERNAL MEDICINE CLINIC | Age: 61
End: 2021-08-12
Payer: COMMERCIAL

## 2021-08-12 VITALS
BODY MASS INDEX: 35.95 KG/M2 | WEIGHT: 280 LBS | DIASTOLIC BLOOD PRESSURE: 82 MMHG | HEART RATE: 83 BPM | OXYGEN SATURATION: 97 % | SYSTOLIC BLOOD PRESSURE: 146 MMHG | RESPIRATION RATE: 16 BRPM

## 2021-08-12 DIAGNOSIS — R07.89 ATYPICAL CHEST PAIN: ICD-10-CM

## 2021-08-12 DIAGNOSIS — R06.09 DYSPNEA ON EXERTION: ICD-10-CM

## 2021-08-12 DIAGNOSIS — Z12.5 SCREENING FOR MALIGNANT NEOPLASM OF PROSTATE: ICD-10-CM

## 2021-08-12 DIAGNOSIS — R21 RASH: ICD-10-CM

## 2021-08-12 DIAGNOSIS — I10 ESSENTIAL HYPERTENSION: ICD-10-CM

## 2021-08-12 DIAGNOSIS — E78.2 MIXED HYPERLIPIDEMIA: ICD-10-CM

## 2021-08-12 DIAGNOSIS — Z00.00 ROUTINE GENERAL MEDICAL EXAMINATION AT A HEALTH CARE FACILITY: Primary | ICD-10-CM

## 2021-08-12 DIAGNOSIS — R07.89 ATYPICAL CHEST PAIN: Primary | ICD-10-CM

## 2021-08-12 LAB
BASOPHILS ABSOLUTE: 0 K/UL (ref 0–0.2)
BASOPHILS RELATIVE PERCENT: 0.3 %
D DIMER: 243 NG/ML(DDU)
EOSINOPHILS ABSOLUTE: 0.3 K/UL (ref 0–0.6)
EOSINOPHILS RELATIVE PERCENT: 3.7 %
HCT VFR BLD CALC: 47 % (ref 40.5–52.5)
HEMOGLOBIN: 15.6 G/DL (ref 13.5–17.5)
LYMPHOCYTES ABSOLUTE: 1.5 K/UL (ref 1–5.1)
LYMPHOCYTES RELATIVE PERCENT: 20.3 %
MCH RBC QN AUTO: 31.2 PG (ref 26–34)
MCHC RBC AUTO-ENTMCNC: 33.1 G/DL (ref 31–36)
MCV RBC AUTO: 94.4 FL (ref 80–100)
MONOCYTES ABSOLUTE: 0.6 K/UL (ref 0–1.3)
MONOCYTES RELATIVE PERCENT: 7.4 %
NEUTROPHILS ABSOLUTE: 5.1 K/UL (ref 1.7–7.7)
NEUTROPHILS RELATIVE PERCENT: 68.3 %
PDW BLD-RTO: 15.1 % (ref 12.4–15.4)
PLATELET # BLD: 247 K/UL (ref 135–450)
PMV BLD AUTO: 7.8 FL (ref 5–10.5)
RBC # BLD: 4.98 M/UL (ref 4.2–5.9)
WBC # BLD: 7.5 K/UL (ref 4–11)

## 2021-08-12 PROCEDURE — 36415 COLL VENOUS BLD VENIPUNCTURE: CPT | Performed by: INTERNAL MEDICINE

## 2021-08-12 PROCEDURE — 99396 PREV VISIT EST AGE 40-64: CPT | Performed by: INTERNAL MEDICINE

## 2021-08-12 PROCEDURE — 85379 FIBRIN DEGRADATION QUANT: CPT

## 2021-08-12 RX ORDER — LOSARTAN POTASSIUM 50 MG/1
50 TABLET ORAL DAILY
Qty: 90 TABLET | Refills: 1 | Status: SHIPPED | OUTPATIENT
Start: 2021-08-12 | End: 2022-02-11 | Stop reason: SDUPTHER

## 2021-08-12 RX ORDER — AMOXICILLIN AND CLAVULANATE POTASSIUM 875; 125 MG/1; MG/1
1 TABLET, FILM COATED ORAL 2 TIMES DAILY
COMMUNITY
Start: 2021-08-09 | End: 2021-08-29

## 2021-08-12 RX ORDER — LOSARTAN POTASSIUM 25 MG/1
25 TABLET ORAL DAILY
Qty: 90 TABLET | Refills: 0 | Status: SHIPPED
Start: 2021-08-12 | End: 2021-08-12 | Stop reason: SDUPTHER

## 2021-08-12 RX ORDER — LOVASTATIN 10 MG/1
10 TABLET ORAL NIGHTLY
Qty: 90 TABLET | Refills: 3 | Status: SHIPPED | OUTPATIENT
Start: 2021-08-12 | End: 2022-05-13 | Stop reason: SDUPTHER

## 2021-08-12 RX ORDER — CLOTRIMAZOLE AND BETAMETHASONE DIPROPIONATE 10; .64 MG/G; MG/G
CREAM TOPICAL 2 TIMES DAILY
Qty: 3 TUBE | Refills: 1 | Status: SHIPPED | OUTPATIENT
Start: 2021-08-12 | End: 2022-05-13 | Stop reason: SDUPTHER

## 2021-08-12 NOTE — PATIENT INSTRUCTIONS
Restart using your walker exercise machine at home. Start this week at 30min/day, or can do 10min x3, or 15min x2. Then the next week 45min/day    Then in two weeks up to 1 hr/day. Also very strongly suggest ADDITIONALLY trying an exercise peddler for 30-60min/day    AIM FOR GOAL # BY NEXT APPT.

## 2021-08-12 NOTE — RESULT ENCOUNTER NOTE
Call pt, HIS DDIMER IS SLIGHTLY ELEVATED, NOT STRONGLY SUGGESTIVE OF A BLOOD CLOT BUT WE NEED TO DEFINITIVELY RULE OUT SO REC CT CHEST W CONTRAST PLS, DX ATYPICAL CHEST PAIN AND ELEVATED D-DIMER.

## 2021-08-12 NOTE — PROGRESS NOTES
History and Physical      Marian Gonzáles  YOB: 1960    Date of Service:  8/12/2021    Chief Complaint:   Marian Gonzáles is a 59 y.o. male who presents for complete physical examination. HPI: Retired in [place] this yr. He has had cont recurring lower cp, his cardiac w/u was neg w Dr Anahi Taylor. No more palpitations. Wt is up since retiring. APPROX 20#. Has been eating more fast food. No regular exercise, much less active w prison. Has some CARTER, limited in exercise. Still smoking but from prior 10-12/day now to 4-5 cig/day.   CXR was nl in Apr.  Is sched for Pulm eval Sylvie Freeze 8/26.    htn- BP stable on meds    Wt Readings from Last 3 Encounters:   08/12/21 280 lb (127 kg)   06/08/21 280 lb (127 kg)   04/28/21 282 lb (127.9 kg)     BP Readings from Last 3 Encounters:   08/12/21 (!) 146/82   06/08/21 128/70   04/28/21 (!) 140/86       Patient Active Problem List   Diagnosis    Hypertension    High triglycerides    Leg pain    Tobacco dependence    Eczematous dermatitis    SOB (shortness of breath)    Precordial pain    Heart palpitations    Family history of early CAD    Numbness of face       Preventive Care:  Health Maintenance   Topic Date Due    COVID-19 Vaccine (1) Never done    HIV screen  Never done    DTaP/Tdap/Td vaccine (1 - Tdap) Never done    Diabetes screen  Never done    Colon Cancer Screen FIT/FOBT  07/12/2013    Lipid screen  07/01/2021    Potassium monitoring  07/01/2021    Creatinine monitoring  07/01/2021    Flu vaccine (1) 09/01/2021    Pneumococcal 0-64 years Vaccine (2 of 2 - PPSV23) 02/02/2023    Shingles Vaccine  Completed    Hepatitis C screen  Completed    Hepatitis A vaccine  Aged Out    Hepatitis B vaccine  Aged Out    Hib vaccine  Aged Out    Meningococcal (ACWY) vaccine  Aged Out         Lipid panel:    Lab Results   Component Value Date    TRIG 251 07/01/2020    HDL 44 07/01/2020    LDLCALC 84 07/01/2020          Immunization History Administered Date(s) Administered    Influenza 2013    Influenza, [place]y [place], IM, (6 mo and older Fluzone, Flulaval, Fluarix and 3 yrs and older Afluria) 10/23/2017, 10/27/2020    Pneumococcal Polysaccharide ([id number]) 2016    Zoster Recombinant (Shingrix) 2020, 2020       Allergies   Allergen Reactions    Hctz      Lightheadedness/nausea- had syncopal episode     Current Outpatient Medications   Medication Sig Dispense Refill    amoxicillin-clavulanate (AUGMENTIN) 875-125 MG per tablet Take 1 tablet by mouth 2 times daily      losartan (COZAAR) 25 MG tablet Take 1 tablet by mouth daily 90 tablet 0    fluticasone (FLONASE) 50 MCG/ACT nasal spray 1 spray by Each Nostril route daily 2 Bottle 1    lovastatin (MEVACOR) 10 MG tablet Take 1 tablet by mouth nightly 90 tablet 3    acetaminophen (TYLENOL) 500 MG tablet Take 500 mg by mouth as needed. No current facility-administered medications for this visit. Past Medical History:   Diagnosis Date    Eczematous dermatitis     Dr Junior Yee, prn halog cream    Family history of coronary artery disease     History of cardiovascular stress test 2021    Normal stress myocardial perfusion.  Hyperlipidemia     Hypertension     Leg pain     R leg, chr pain.  Skin lesion     Tobacco dependence      No past surgical history on file.   Family History   Problem Relation Age of Onset   Zac Hamilton Cancer Mother         \"mass behind kidney\"   Zac Hamilton Other Father          of MI     Social History     Socioeconomic History    Marital status:      Spouse name: Not on file    Number of children: Not on file    Years of education: Not on file    Highest education level: Not on file   Occupational History    Occupation: as noted     Comment: [employer]    Tobacco Use    Smoking status: Current Every Day Smoker     Packs/day: 0.25     Years: 30.00     Pack years: 7.50     Types: Cigarettes    Smokeless tobacco: Never Used   Substance and Sexual Activity    Alcohol use: Yes     Alcohol/week: 0.0 standard drinks     Comment: occ.  Drug use: Not on file    Sexual activity: Not on file   Other Topics Concern    Not on file   Social History Narrative    Diet: Unrestricted    Exercise: None    Seat Belt: Always     Social Determinants of Health     Financial Resource Strain:     Difficulty of Paying Living Expenses:    Food Insecurity:     Worried About Running Out of Food in the Last Year:     [address] in the Last Year:    Transportation Needs:     Lack of Transportation (Medical):  Lack of Transportation (Non-Medical):    Physical Activity:     Days of Exercise per Week:     Minutes of Exercise per Session:    Stress:     Feeling of Stress :    Social Connections:     Frequency of Communication with Friends and Family:     Frequency of Social Gatherings with Friends and Family:     Attends Latter day Services:     Active Member of Clubs or Organizations:     Attends Club or Organization Meetings:     Marital Status:    Intimate Partner Violence:     Fear of Current or Ex-Partner:     Emotionally Abused:     Physically Abused:     Sexually Abused:        Review of Systems:  A comprehensive review of systems was negative except for what was noted in the HPI. Physical Exam:   Vitals:    08/12/21 1032   BP: (!) 146/82   Pulse: 83   Resp: 16   SpO2: 97%   Weight: 280 lb (127 kg)     Body mass index is 35.95 kg/m². Constitutional: He is oriented to person, place, and time. He appears well-developed and well-nourished. No distress. HEENT:   Head: Normocephalic and atraumatic. Eyes: Conjunctivae and extraocular motions are normal. Pupils are equal, round, and reactive to light. Neck: Full passive range of motion without pain. Neck supple. No JVD present. No mass and no thyromegaly present. Cardiovascular: Normal rate, regular rhythm, normal heart sounds and intact distal pulses.   Exam reveals no gallop and no friction rub. No murmur heard. Pulmonary/Chest: Effort normal and breath sounds normal. No respiratory distress. He has no wheezes, rhonchi or rales. Abdominal: Soft, non-tender. Bowel sounds and aorta are normal. There is no organomegaly, mass or bruit. Musculoskeletal: Normal range of motion, no synovitis. He exhibits no edema. Neurological: He is alert and oriented to person, place, and time. He has normal reflexes. No cranial nerve deficit. Coordination normal.   Skin: Skin is warm, dry and intact. Psychiatric: He has a normal mood and affect. His speech is normal and behavior is normal. Judgment, cognition and memory are normal.         Assessment/Plan:    Jazmyne Morley was seen today for annual exam, shortness of breath and wheezing. Diagnoses and all orders for this visit:    Routine general medical examination at a health care facility - Overall general medical exam appears benign, other assessments as noted and testing is as noted below. Essential hypertension- bp sl high, incr losartan 25--50mg  Needs aggressive work w diet and exercise, wt loss. Scr lab. -     Discontinue: losartan (COZAAR) 25 MG tablet; Take 1 tablet by mouth daily  -     Lipid Panel; Future  -     CBC Auto Differential; Future  -     Comprehensive Metabolic Panel; Future  -     TSH without Reflex; Future  -     losartan (COZAAR) 50 MG tablet; Take 1 tablet by mouth daily    Atypical chest pain- d-dimer comes in trace elevated so we'll check CT chest r/o PE  -     Cancel: D-DIMER, QUANTITATIVE; Future    Dyspnea on exertion  -     Cancel: D-DIMER, QUANTITATIVE; Future    Screening for malignant neoplasm of prostate- scr due  -     PSA, Prostatic Specific Antigen; Future    Mixed hyperlipidemia - Pt will continue to work on a low fat diet and also exercise, wt loss as appropriate. Will continue periodic monitoring of fasting lipid profile, glucose, liver function.     -     lovastatin (MEVACOR) 10 MG tablet; Take 1 tablet by mouth nightly    Rash- using rx prn w rf requested  -     clotrimazole-betamethasone (LOTRISONE) 1-0.05 % cream; Apply topically 2 times daily    PT INSTRUCTIONS:  Restart using your walker exercise machine at home. Start this week at 30min/day, or can do 10min x3, or 15min x2. Then the next week 45min/day    Then in two weeks up to 1 hr/day. Also very strongly suggest ADDITIONALLY trying an exercise peddler for 30-60min/day    AIM FOR GOAL # BY NEXT APPT.

## 2021-08-13 DIAGNOSIS — R79.89 ELEVATED D-DIMER: Primary | ICD-10-CM

## 2021-08-13 DIAGNOSIS — R07.89 ATYPICAL CHEST PAIN: ICD-10-CM

## 2021-08-13 LAB
A/G RATIO: 2 (ref 1.1–2.2)
ALBUMIN SERPL-MCNC: 4.7 G/DL (ref 3.4–5)
ALP BLD-CCNC: 73 U/L (ref 40–129)
ALT SERPL-CCNC: 35 U/L (ref 10–40)
ANION GAP SERPL CALCULATED.3IONS-SCNC: 11 MMOL/L (ref 3–16)
AST SERPL-CCNC: 34 U/L (ref 15–37)
BILIRUB SERPL-MCNC: 0.6 MG/DL (ref 0–1)
BUN BLDV-MCNC: 10 MG/DL (ref 7–20)
CALCIUM SERPL-MCNC: 9.2 MG/DL (ref 8.3–10.6)
CHLORIDE BLD-SCNC: 102 MMOL/L (ref 99–110)
CHOLESTEROL, TOTAL: 172 MG/DL (ref 0–199)
CO2: 27 MMOL/L (ref 21–32)
CREAT SERPL-MCNC: 0.8 MG/DL (ref 0.8–1.3)
GFR AFRICAN AMERICAN: >60
GFR NON-AFRICAN AMERICAN: >60
GLOBULIN: 2.3 G/DL
GLUCOSE BLD-MCNC: 88 MG/DL (ref 70–99)
HDLC SERPL-MCNC: 41 MG/DL (ref 40–60)
LDL CHOLESTEROL CALCULATED: 94 MG/DL
POTASSIUM SERPL-SCNC: 4.7 MMOL/L (ref 3.5–5.1)
PROSTATE SPECIFIC ANTIGEN: 2.51 NG/ML (ref 0–4)
SODIUM BLD-SCNC: 140 MMOL/L (ref 136–145)
TOTAL PROTEIN: 7 G/DL (ref 6.4–8.2)
TRIGL SERPL-MCNC: 187 MG/DL (ref 0–150)
TSH SERPL DL<=0.05 MIU/L-ACNC: 1.77 UIU/ML (ref 0.27–4.2)
VLDLC SERPL CALC-MCNC: 37 MG/DL

## 2021-08-13 NOTE — RESULT ENCOUNTER NOTE
Call pt, labs ok/chol o, HIS PSA AND THYROID FXN ARE ALSO NL.  LASTLY, PRIOR SUGAR 112 A YEAR AGO IS NOW NL AT 88. HOWEVER AS DISCUSSED, STILL VERY IMPORTANT TO WORK ON DAILY EXERCISE AND WT LOSS.   WE WILL CALL BACK WHEN WE GET HIS CT CHEST RESULTS BACK TOO

## 2021-08-19 ENCOUNTER — HOSPITAL ENCOUNTER (OUTPATIENT)
Dept: CT IMAGING | Age: 61
Discharge: HOME | End: 2021-08-19
Payer: COMMERCIAL

## 2021-08-19 DIAGNOSIS — R79.89 ELEVATED D-DIMER: ICD-10-CM

## 2021-08-19 DIAGNOSIS — R07.89 ATYPICAL CHEST PAIN: ICD-10-CM

## 2021-08-19 PROCEDURE — 6360000004 HC RX CONTRAST MEDICATION: Performed by: INTERNAL MEDICINE

## 2021-08-19 PROCEDURE — 71275 CT ANGIOGRAPHY CHEST: CPT

## 2021-08-19 RX ORDER — SODIUM CHLORIDE 0.9 % (FLUSH) 0.9 %
5-40 SYRINGE (ML) INJECTION PRN
Status: DISCONTINUED | OUTPATIENT
Start: 2021-08-19 | End: 2021-08-20 | Stop reason: HOSPADM

## 2021-08-19 RX ADMIN — IOPAMIDOL 75 ML: 755 INJECTION, SOLUTION INTRAVENOUS at 09:20

## 2021-08-20 PROBLEM — E27.8 ADRENAL NODULE (HCC): Status: ACTIVE | Noted: 2021-08-01

## 2021-08-20 PROBLEM — E27.9 ADRENAL NODULE (HCC): Status: ACTIVE | Noted: 2021-08-01

## 2021-08-26 ENCOUNTER — INITIAL CONSULT (OUTPATIENT)
Dept: PULMONOLOGY | Age: 61
End: 2021-08-26
Payer: COMMERCIAL

## 2021-08-26 VITALS
BODY MASS INDEX: 35.94 KG/M2 | HEIGHT: 74 IN | WEIGHT: 280 LBS | DIASTOLIC BLOOD PRESSURE: 70 MMHG | OXYGEN SATURATION: 96 % | HEART RATE: 84 BPM | SYSTOLIC BLOOD PRESSURE: 155 MMHG

## 2021-08-26 DIAGNOSIS — Z00.00 HEALTHCARE MAINTENANCE: ICD-10-CM

## 2021-08-26 DIAGNOSIS — Z01.818 PREOP TESTING: ICD-10-CM

## 2021-08-26 DIAGNOSIS — R06.02 SOBOE (SHORTNESS OF BREATH ON EXERTION): Primary | ICD-10-CM

## 2021-08-26 DIAGNOSIS — F17.200 TOBACCO DEPENDENCE: ICD-10-CM

## 2021-08-26 DIAGNOSIS — E66.01 MORBID OBESITY DUE TO EXCESS CALORIES (HCC): ICD-10-CM

## 2021-08-26 PROCEDURE — 99204 OFFICE O/P NEW MOD 45 MIN: CPT | Performed by: NURSE PRACTITIONER

## 2021-08-26 ASSESSMENT — COPD QUESTIONNAIRES
QUESTION_1 IN PAST WEEK HOW OFTEN DID YOU FEEL SHORT OF BREATH WHILE AT REST: 1
QUESTION_2 IN PAST WEEK HOW OFTEN DID YOU FEEL SHORT OF BREATH WHILE DOING PHYSICAL ACTIVITIES: 5
QUESTION_7 IN PAST WEEK AVERAGE AMOUNT YOUR ABILITY TO DO STRENUOUS PHYSICAL ACTIVITIES (SUCH AS CLIMBING STAIRS, HURRYING, PARTICIPATING IN SPORTS) WERE LIMITED BECAUSE OF BREATHING PROBLEMS: 6
QUESTION_9 IN PAST WEEK AVERAGE AMOUNT YOUR SOCIAL ACTIVITIES (SUCH AS TALKING, BEING WITH CHILDREN, VISITING FRIENDS/RELATIVES) WERE LIMITED DUE TO BREATHING PROBLEMS: 1
QUESTION_10 IN PAST WEEK AVERAGE AMOUNT YOUR ABILITY TO DO SOCIAL ACTIVITIES (SUCH AS TALKING, BEING WITH CHILDREN, VISITING FRIENDS/RELATIVES) WERE LIMITED BECAUSE OF BREATHING PROBLEMS: 0
QUESTION_8 IN PAST WEEK AVERAGE AMOUNT YOUR ABILITY TO DO MODERATE PHYSICAL ACTIVITIES (SUCH AS WALKING, HOUSEWORK, CARRYING THINGS) WERE LIMITED BECAUSE OF BREATHING PROBLEMS: 4
QUESTION_5 IN PAST WEEK HOW MUCH OF THE TIME DID YOU COUGH: 5
QUESTION_6 IN PAST WEEK HOW MUCH OF THE TIME DID YOU PRODUCT SPUTUM OR PHLEGM: 6
QUESTION_3 IN PAST WEEK HOW OFTEN DID YOU FEEL CONCERNED ABOUT GETTING A COLD OR YOUR BREATHING GETTING WORSE: 4
QUESTION_4 IN PAST WEEK HOW OFTEN DID YOU FEEL DEPRESSED (DOWN) BECAUSE OF YOUR BREATHING PROBLEMS: 0

## 2021-08-26 NOTE — PROGRESS NOTES
Subjective:   CHIEF COMPLAINT / HPI:       Antonella Arthur is a 59 y.o. male with history of heart palpitations, HLD, HTN,, presents to pulmonary clinic for evaluation of shortness of breath. Olen Schwab states he has had shortness of breath for several months with activity. Shortness of breath with climbing steps, incline, denies shortness of breath with conversation or rest.  His primary care provider recently obtain blood work including a D-dimer which was slightly elevated. He did complete a CTA of his chest study on 8/19/2021 which demonstrated adrenal nodule and an flattened 8 mm lymph node along minor fissure. He is a current smoker. He was smoking 1/2-3/4 of a packs of cigarettes a day until his long-term in January he is now smoking 4 to 5 cigarettes a day and attempts to quit smoking. He states he retired in January and since has gained approximately 20 pounds this past year due to increase in fast food consumption and decreased physical activity. He declines ever having a pulmonary function test or CT lung screening. He denies ever seen a pulmonologist in the past.    Olen Schwab states they are practicing social distancing, wearing a mask when out in public, washing hands frequently or using hand . Denies any fever, chills, malaise, change in sensation of taste or smell, headache or lightheadedness. Denies any known contacts with persons with respiratory infection, positive for coronavirus or under investigation for possible coronavirus exposure.     Influenza immunization: 10/27/2020  Pneumococcal immunization: PPV 23 on 7/6/2016  COVID-19 immunization: Has not received  Smoking history: Current smoker, has smoked over 30 years  PCP: Antonia Garza MD    Past Medical History:  Past Medical History:   Diagnosis Date    Adrenal nodule ([facility] [address] 08/2021    NOTED ON CT CHEST **CONSIDER FURTHER EVAL, IMAGING, LABS**    Eczematous dermatitis     Dr Luisana Martinez, prn halo cream    Family history of coronary artery disease     History of cardiovascular stress test 05/06/2021    Normal stress myocardial perfusion.  Hyperlipidemia     Hypertension     Leg pain     R leg, chr pain.  Skin lesion     Tobacco dependence        Current Medications:      Current Outpatient Medications:     losartan (COZAAR) 50 MG tablet, Take 1 tablet by mouth daily, Disp: 90 tablet, Rfl: 1    lovastatin (MEVACOR) 10 MG tablet, Take 1 tablet by mouth nightly, Disp: 90 tablet, Rfl: 3    clotrimazole-betamethasone (LOTRISONE) 1-0.05 % cream, Apply topically 2 times daily, Disp: 3 Tube, Rfl: 1    fluticasone (FLONASE) 50 MCG/ACT nasal spray, 1 spray by Each Nostril route daily, Disp: 2 Bottle, Rfl: 1    acetaminophen (TYLENOL) 500 MG tablet, Take 500 mg by mouth as needed. , Disp: , Rfl:     Allergies   Allergen Reactions    Hctz      Lightheadedness/nausea- had syncopal episode       Social History:    Social History     Socioeconomic History    Marital status:      Spouse name: None    Number of children: None    Years of education: None    Highest education level: None   Occupational History    Occupation: as noted     Comment: [employer]   Tobacco Use    Smoking status: Current Every Day Smoker     Packs/day: 0.50     Years: 30.00     Pack years: 15.00     Types: Cigarettes    Smokeless tobacco: Never Used   Substance and Sexual Activity    Alcohol use: Yes     Alcohol/week: 0.0 standard drinks     Comment: occ.  Drug use: None    Sexual activity: None   Other Topics Concern    None   Social History Narrative    Diet: Unrestricted    Exercise: None    Seat Belt: Always     Social Determinants of Health     Financial Resource Strain:     Difficulty of Paying Living Expenses:    Food Insecurity:     Worried About Running Out of Food in the Last Year:     [address] in the Last Year:    Transportation Needs:     Lack of Transportation (Medical):      Lack of Transportation (Non-Medical):    Physical Activity:     Days of Exercise per Week:     Minutes of Exercise per Session:    Stress:     Feeling of Stress :    Social Connections:     Frequency of Communication with Friends and Family:     Frequency of Social Gatherings with Friends and Family:     Attends Restorationism Services:     Active Member of Clubs or Organizations:     Attends Club or Organization Meetings:     Marital Status:    Intimate Partner Violence:     Fear of Current or Ex-Partner:     Emotionally Abused:     Physically Abused:     Sexually Abused:        Family History:    Family History   Problem Relation Age of Onset    Cancer Mother         \"mass behind kidney\"    Other Father          of MI         REVIEW OF SYSTEMS:    CONSTITUTIONAL:  Negative for fevers, chills, diaphoresis, activity change, appetite change, night sweats, unexpected weight change. HEENT:  Negative for hearing loss,  sinus pressure, nasal congestion, epistaxis and snoring  RESPIRATORY: Positive shortness of breath on exertion, negative wheeze, negative cough  CARDIOVASCULAR:  Negative for chest pain, palpitations, exertional chest pressure/discomfort, edema, syncope  GASTROINTESTINAL: Negative for nausea, vomiting, diarrhea, constipation, blood in stool and abdominal pain  GENITOURINARY:  Negative for frequency, dysuria and hematuria  HEMATOLOGIC/LYMPHATIC:  Negative for easy bruising, bleeding and lymphadenopathy  ALLERGIC/IMMUNOLOGIC:  Negative for recurrent infections, angioedema, anaphylaxis and drug reaction  MUSCULOSKELETAL:  Negative for  pain, joint swelling, decreased range of motion and muscle weakness  NEURO: Negative for headache, AMS, decrease sensations  SKIN: Negative for rashes or lesions      Objective:   VITALS:   Vitals:    21 1458   BP: (!) 155/70   Pulse: 84   SpO2: 96%   Weight: 280 lb (127 kg)   Height: 6' 2\" (1.88 m)     Body mass index is 35.95 kg/m².     PHYSICAL EXAM: CONSTITUTIONAL:  awake, alert, cooperative, no apparent distress, and appears stated age  HEENT:  Supple and nontender,  trachea midline, no adenopathy, thyroid normal, no JVD, no wheezing or stridor over neck  CHEST: Chest expansion equal and symmetrical, no intercostal retraction, no increased work of breathing  LUNGS: Bilateral breath sounds clear and equal to auscultation, good air movement, no use of accessory muscles to support respiratory effort. No forced expiratory wheeze, no rales, no rhonchi, no cough noted during assessment. O2 sat at rest on room air is 96%   CARDIOVASCULAR: Normal S1 and S2 , no murmurs or gallops ,no pericardial rubs  ABDOMEN:  normal bowel sounds, non-distended and no masses palpated, and no tenderness to palpation. LYMPHADENOPATHY:  no axillary or supraclavicular adenopathy. No cervical adenopathy  EXTREMITIES: No edema, no inflammation, no tenderness, no clubbing of digits  NEURO: Oriented X 3, No focal deficits  SKIN: warm and dry    RADIOLOGY:  8/19/2021 CTA pulmonary with contrast for complaint of shortness of breath     Pulmonary Arteries: Pulmonary arteries are adequately opacified for   evaluation.  No pulmonary embolism demonstrated.  Main pulmonary artery is   normal in caliber.       Mediastinum: The heart and pericardium demonstrate no acute abnormality. There is no acute abnormality of the thoracic aorta. No pericardial effusion       Lungs/pleura: No acute process.  No evidence of pneumonia or pulmonary edema. No pleural effusion or pneumothorax. Flattened subcentimeter focus along the   minor fissure shown on coronal images optimally, most consistent with a lymph   node measuring 8 mm.  Small amount of left basilar atelectasis.       Upper Abdomen:  There is a 2.3 cm left adrenal nodule, indeterminate age,   cannot be diagnosis adenoma based on density evaluation on this exam.  There   is hepatic steatosis.       Soft Tissues/Bones: No acute bone or soft tissue abnormality. 4/30/2021 chest x-ray 2 view  No acute cardiopulmonary process    5/6/2021 nuclear med stress test   Completed 6 METS and 5 Mins of exercise    ECG portion of stress test is negative for ischemia by diagnostic criteria.    Normal EF 67 % with normal ventricular contractility. No infarct or ischemia    noted.    Normal stress myocardial perfusion.    This is a normal study. 5/19/2021 echocardiogram  Limited study due to patients body habitus. Left ventricular function is normal, EF is estimated at 55-60%. Mild left ventricular hypertrophy. Grade I diastolic dysfunction. No regional wall motion abnormalities were detected. Mildly dilated left atrium. No significant valvular abnormalities. RVSP is 17 mmHg. No evidence of pericardial effusion. PFT:   To be obtained    Assessment      1. SOBOE (shortness of breath on exertion)    2. Tobacco dependence    3. Morbid obesity due to excess calories ([address]    4. Preop testing    5. Healthcare maintenance        Plan:  --Pulmonary function test and Covid prescreening test to assess lung function  --Encourage continue patient of smoking cessation. I have instructed Marlys Moraes that I do not recommend the replacement of cigarettes with other nicotine products such as nicotine patches or nicotine gum I do recommend that he stop immediately, that he substitute cigarettes with sugar-free candy or sugar-free gum, raw fruits or raw vegetables. Should he need to have something to satisfy the hand to mouth action that he gets with cigarettes I have recommended that he take a drinking straw cut it in half and use it as if it is a cigarette, or try a toothpick. I have discussed how continuation of cigarette smoking will lead to worsening of lung disease, cardiovascular disease and deterioration of overall health.   --Monitor diet, minimize or eliminate fast food, increase fluid, increase activity  --While he has not been vaccinated at this time for COVID-19 I strongly recommend that he continue Coronavirus precaution including: Wearing mask when in public and when in contact with persons who have not been immunized social distancing when needing to be in public, handwashing practice, wiping items touched in public such as gas pumps, door handles, shopping carts, etc.  --Recommend yearly flu vaccination  --Recommend Covid 19 immunization  --Recommend Pneumovax vaccination  --Have discussed signs and symptoms of COPD exacerbation and why it is important to monitor for bronchial infections. To call office should she develop signs of COPD exacerbation/bronchial infection  --I will continue to follow in pulmonary clinic  . Return in about 6 weeks (around 10/7/2021) for PFT virtual visit. This dictation was performed with a verbal recognition program and it was checked for errors. It is possible that there are still dictated errors within this office note. Any errors should be brought immediately to my attention for correction. All efforts were made to ensure that this office note is accurate.        Electronically signed by TACO Ness CNP on 9/11/2021 at 9:59 PM

## 2021-09-08 DIAGNOSIS — Z01.811 PREOP PULMONARY/RESPIRATORY EXAM: Primary | ICD-10-CM

## 2021-09-20 ENCOUNTER — HOSPITAL ENCOUNTER (OUTPATIENT)
Dept: LAB | Age: 61
Discharge: HOME | End: 2021-09-20
Payer: COMMERCIAL

## 2021-09-20 PROCEDURE — U0005 INFEC AGEN DETEC AMPLI PROBE: HCPCS

## 2021-09-20 PROCEDURE — U0003 INFECTIOUS AGENT DETECTION BY NUCLEIC ACID (DNA OR RNA); SEVERE ACUTE RESPIRATORY SYNDROME CORONAVIRUS 2 (SARS-COV-2) (CORONAVIRUS DISEASE [COVID-19]), AMPLIFIED PROBE TECHNIQUE, MAKING USE OF HIGH THROUGHPUT TECHNOLOGIES AS DESCRIBED BY CMS-2020-01-R: HCPCS

## 2021-09-22 LAB
SARS-COV-2: NOT DETECTED
SOURCE: NORMAL

## 2021-09-23 ENCOUNTER — HOSPITAL ENCOUNTER (OUTPATIENT)
Dept: PULMONOLOGY | Age: 61
Discharge: HOME | End: 2021-09-23
Payer: COMMERCIAL

## 2021-09-23 DIAGNOSIS — R06.02 SOBOE (SHORTNESS OF BREATH ON EXERTION): ICD-10-CM

## 2021-09-23 LAB
DLCO %PRED: 77 %
DLCO PRED: NORMAL
DLCO/VA %PRED: NORMAL
DLCO/VA PRED: NORMAL
DLCO/VA: NORMAL
DLCO: NORMAL
EXPIRATORY TIME-POST: NORMAL
EXPIRATORY TIME: NORMAL
FEF 25-75% %CHNG: NORMAL
FEF 25-75% %PRED-POST: NORMAL
FEF 25-75% %PRED-PRE: NORMAL
FEF 25-75% PRED: NORMAL
FEF 25-75%-POST: NORMAL
FEF 25-75%-PRE: NORMAL
FEV1 %PRED-POST: 61 %
FEV1 %PRED-PRE: 60 %
FEV1 PRED: NORMAL
FEV1-POST: NORMAL
FEV1-PRE: NORMAL
FEV1/FVC %PRED-POST: NORMAL
FEV1/FVC %PRED-PRE: NORMAL
FEV1/FVC PRED: NORMAL
FEV1/FVC-POST: 100 %
FEV1/FVC-PRE: 93 %
FVC %PRED-POST: NORMAL
FVC %PRED-PRE: NORMAL
FVC PRED: NORMAL
FVC-POST: NORMAL
FVC-PRE: NORMAL
GAW %PRED: NORMAL
GAW PRED: NORMAL
GAW: NORMAL
IC %PRED: NORMAL
IC PRED: NORMAL
IC: NORMAL
MEP: NORMAL
MIP: NORMAL
MVV %PRED-PRE: NORMAL
MVV PRED: NORMAL
MVV-PRE: NORMAL
PEF %PRED-POST: NORMAL
PEF %PRED-PRE: NORMAL
PEF PRED: NORMAL
PEF%CHNG: NORMAL
PEF-POST: NORMAL
PEF-PRE: NORMAL
RAW %PRED: NORMAL
RAW PRED: NORMAL
RAW: NORMAL
RV %PRED: NORMAL
RV PRED: NORMAL
RV: NORMAL
SVC %PRED: NORMAL
SVC PRED: NORMAL
SVC: NORMAL
TLC %PRED: 153 %
TLC PRED: NORMAL
TLC: NORMAL
VA %PRED: NORMAL
VA PRED: NORMAL
VA: NORMAL
VTG %PRED: NORMAL
VTG PRED: NORMAL
VTG: NORMAL

## 2021-09-23 PROCEDURE — 94060 EVALUATION OF WHEEZING: CPT

## 2021-09-23 PROCEDURE — 94729 DIFFUSING CAPACITY: CPT

## 2021-09-23 PROCEDURE — 94727 GAS DIL/WSHOT DETER LNG VOL: CPT

## 2021-09-23 ASSESSMENT — PULMONARY FUNCTION TESTS
FEV1/FVC_POST: 100
FEV1_PERCENT_PREDICTED_POST: 61
FEV1/FVC_PRE: 93
FEV1_PERCENT_PREDICTED_PRE: 60

## 2021-10-07 ENCOUNTER — OFFICE VISIT (OUTPATIENT)
Dept: PULMONOLOGY | Age: 61
End: 2021-10-07
Payer: COMMERCIAL

## 2021-10-07 VITALS
BODY MASS INDEX: 35.55 KG/M2 | HEART RATE: 76 BPM | SYSTOLIC BLOOD PRESSURE: 125 MMHG | HEIGHT: 74 IN | DIASTOLIC BLOOD PRESSURE: 65 MMHG | WEIGHT: 277 LBS | RESPIRATION RATE: 96 BRPM

## 2021-10-07 DIAGNOSIS — Z00.00 HEALTHCARE MAINTENANCE: ICD-10-CM

## 2021-10-07 DIAGNOSIS — J43.9 PULMONARY EMPHYSEMA, UNSPECIFIED EMPHYSEMA TYPE (HCC): Primary | ICD-10-CM

## 2021-10-07 DIAGNOSIS — Z87.891 PERSONAL HISTORY OF TOBACCO USE: ICD-10-CM

## 2021-10-07 DIAGNOSIS — R06.02 SOBOE (SHORTNESS OF BREATH ON EXERTION): ICD-10-CM

## 2021-10-07 DIAGNOSIS — E66.01 MORBID OBESITY DUE TO EXCESS CALORIES (HCC): ICD-10-CM

## 2021-10-07 PROCEDURE — 99214 OFFICE O/P EST MOD 30 MIN: CPT | Performed by: NURSE PRACTITIONER

## 2021-10-07 PROCEDURE — G0296 VISIT TO DETERM LDCT ELIG: HCPCS | Performed by: NURSE PRACTITIONER

## 2021-10-07 RX ORDER — ALBUTEROL SULFATE 90 UG/1
2 AEROSOL, METERED RESPIRATORY (INHALATION) EVERY 6 HOURS PRN
Qty: 1 EACH | Refills: 5 | Status: SHIPPED | OUTPATIENT
Start: 2021-10-07 | End: 2022-05-13

## 2021-10-07 RX ORDER — BUDESONIDE AND FORMOTEROL FUMARATE DIHYDRATE 160; 4.5 UG/1; UG/1
2 AEROSOL RESPIRATORY (INHALATION) 2 TIMES DAILY
Qty: 3 EACH | Refills: 3 | Status: SHIPPED | OUTPATIENT
Start: 2021-11-08 | End: 2022-05-17 | Stop reason: SDUPTHER

## 2021-10-07 RX ORDER — BUDESONIDE AND FORMOTEROL FUMARATE DIHYDRATE 160; 4.5 UG/1; UG/1
2 AEROSOL RESPIRATORY (INHALATION) 2 TIMES DAILY
Qty: 1 EACH | Refills: 0 | Status: SHIPPED | OUTPATIENT
Start: 2021-10-07 | End: 2022-02-11

## 2021-10-07 NOTE — PROGRESS NOTES
.                                                                                                                          Pulmonary Clinic Office Follow up     Rey Babcock is a 59 y.o. male with history of heart palpitations, HLD, HTN, current smoker, shortness of breath with exertion, presents today to the pulmonary clinic for follow up. Box David states he has not required hospitalization, visits to the emergency room, walk-in clinic, urgent care for respiratory related illness since last visit on 8/26/2021. He did complete a pulmonary function test on 9/23/2021 which demonstrates moderate obstructive airway disease of emphysema type. We discussed starting on Symbicort 2 puffs twice a day with a spacer which she states he has at home, continuing his albuterol rescue inhaler. He is a current smoker with a 30-year history of smoking who is currently on a cessation program and is currently only smoking 4 cigarettes a day. He states the 4 cigarettes he smokes are usually to when he goes to  food for the evening and 2 on his way back from picking up food for the evening. We have discussed alternatives to cigarette smoking when driving such as leaving cigarettes in the trunk or leaving cigarettes at home. Trying alternative oral satisfaction such as gum, candy, toothpick, chewing on drinking straw etc.    Gaby Cardoso has not received his Covid immunization and states is not sure that he will receive immunization. I have recommended to him that based on his COPD/emphysema, shortness of breath, morbid obesity I highly recommend that he do receive immunization. Isauro Hernandez states they are practicing social distancing, wearing a mask when out in public, washing hands frequently or using hand . Denies any fever, chills, malaise, change in sensation of taste or smell, headache or lightheadedness.   Denies any known contacts with persons with respiratory infection, positive for coronavirus or under investigation for possible coronavirus exposure. Influenza immunization: 10/27/2020  Pneumococcal immunization: PPV 23 on 7/6/2016  COVID-19 immunization: Has not received  Smoking history: Current smoker, has smoked over 30 years, 15-pack-year history -currently smoking 4 cigarettes a day  PCP: Pj Rajput MD    Past Medical History:  Past Medical History:   Diagnosis Date    Adrenal nodule ([address] 08/2021    NOTED ON CT CHEST **CONSIDER FURTHER EVAL, IMAGING, LABS**    Eczematous dermatitis     Dr Sy House, prn halog cream    Family history of coronary artery disease     History of cardiovascular stress test 05/06/2021    Normal stress myocardial perfusion.  Hyperlipidemia     Hypertension     Leg pain     R leg, chr pain.  Skin lesion     Tobacco dependence        Current medications and allergies have been reviewed    Social and family history unchanged from initial consult      REVIEW OF SYSTEMS:    CONSTITUTIONAL:  negative for fevers, chills, diaphoresis, activity change, appetite change, night sweats and unexpected weight change.    HEENT:  Negative for hearing loss, sinus pressure, nasal congestion, epistaxis, snoring  RESPIRATORY: Positive occasional shortness of breath, negative cough, negative wheeze  CARDIOVASCULAR:  Negative for chest pain, palpitations, exertional chest pressure/discomfort, edema, syncope  GASTROINTESTINAL: Negative for nausea, vomiting, diarrhea, constipation, blood in stool and abdominal pain  GENITOURINARY:  Negative for frequency, dysuria and hematuria  HEMATOLOGIC/LYMPHATIC:  Negative for easy bruising, bleeding and lymphadenopathy  ALLERGIC/IMMUNOLOGIC:  Negative for recurrent infections, angioedema, anaphylaxis and drug reaction  MUSCULOSKELETAL:  Negative for pain, joint swelling, decreased range of motion and muscle weakness  NEURO: Negative for headache, AMS, decrease sensations  SKIN: Negative for rashes or lesions      Physical Exam:  /65   Pulse predicted/57% predicted , % predicted, % predicted, DLCO 77% predicted  Following administration of bronchodilator there was no significant response to bronchodilator. Overall testing indicates moderate obstructive airway disease as indicated by moderate airway obstruction and overinflation indicating emphysema type    ASSESSMENT/PLAN:  1. Personal history of tobacco use    2. Pulmonary emphysema, unspecified emphysema type ([facility] [address]        --We will start on Symbicort. Will send 1 prescription for 1 month to local pharmacy with refills to be completed through mail order pharmacy. --Continue Flonase each morning denies need for refill  --Continue albuterol rescue inhaler for shortness of breath, bronchospasms, wheezing  --Repeat pulmonary function test in 1 year  --I have instructed Glenis Sanchez that I do not recommend the replacement of cigarettes with other nicotine products such as nicotine patches or nicotine gum I do recommend that he stop immediately, that he substitute cigarettes with sugar-free candy or sugar-free gum, raw fruits or raw vegetables. Should he need to have something to satisfy the hand to mouth action that he gets with cigarettes I have recommended that he take a drinking straw cut it in half and use it as if it is a cigarette, or try a toothpick.   I have discussed how continuation of cigarette smoking will lead to worsening of lung disease, cardiovascular disease and deterioration of overall health.  --CT lung screening, current smoker  --While he has not been vaccinated at this time for COVID-19, I strongly recommend that he continue Coronavirus precaution including: Wearing mask when in public and when in contact with persons who have not been immunized, social distancing when needing to be in public, handwashing practice, wiping items touched in public such as gas pumps, door handles, shopping carts, etc.  --Recommend yearly flu vaccination  --Recommend Covid 19 booster when available  --Recommend Pneumovax vaccination  --Have discussed signs and symptoms of COPD/emphysema exacerbation and why it is important to monitor for bronchial infections. Instructed to call office should he develop signs of COPD/emphysema exacerbation/bronchial infection  --I will continue to follow in pulmonary clinic    Return in about 6 months (around 4/7/2022) for CT Chest, 6 month follow up. This dictation was performed with a verbal recognition program and it was checked for errors. It is possible that there are still dictated errors within this office note. Any errors should be brought immediately to my attention for correction. All efforts were made to ensure that this office note is accurate. Low Dose CT (LDCT) Lung Screening criteria met:     Age 55-77(Medicare) or 50-80 ([facility])   Pack year smoking >30 (Medicare) or >20 ([facility])   Still smoking or less than 15 year since quit   No sign or symptoms of lung cancer   > 11 months since last LDCT     Risks and benefits of lung cancer screening with LDCT scans discussed:    Significance of positive screen - False-positive LDCT results often occur. 95% of all positive results do not lead to a diagnosis of cancer. Usually further imaging can resolve most false-positive results; however, some patients may require invasive procedures. Over diagnosis risk - 10% to 12% of screen-detected lung cancer cases are over diagnosedthat is, the cancer would not have been detected in the patient's lifetime without the screening. Need for follow up screens annually to continue lung cancer screening effectiveness     Risks associated with radiation from annual LDCT- Radiation exposure is about the same as for a mammogram, which is about 1/3 of the annual background radiation exposure from everyday life. Starting screening at age 54 is not likely to increase cancer risk from radiation exposure.     Patients with comorbidities resulting in life expectancy of < 10 years, or that would preclude treatment of an abnormality identified on CT, should not be screened due to lack of benefit.     To obtain maximal benefit from this screening, smoking cessation and long-term abstinence from smoking is critical

## 2021-11-22 ENCOUNTER — HOSPITAL ENCOUNTER (OUTPATIENT)
Dept: CT IMAGING | Age: 61
Discharge: HOME | End: 2021-11-22
Payer: COMMERCIAL

## 2021-11-22 DIAGNOSIS — Z87.891 PERSONAL HISTORY OF TOBACCO USE: ICD-10-CM

## 2021-11-22 PROCEDURE — 71271 CT THORAX LUNG CANCER SCR C-: CPT

## 2021-11-24 NOTE — PROGRESS NOTES
Spoke with Bronwyn Bucio Concerning recent CT results. Findings demonstrate no suspicious mass or nodules. Mild emphysema. He did state that inhaler he was prescribed at last visit have helped significantly. He states after 2 to 3 days he felt improvement with his breathing. He is to continue inhalers. Should he need refills before his next appointment in April he is to contact the pulmonary clinic for refills.

## 2022-02-11 DIAGNOSIS — I10 ESSENTIAL HYPERTENSION: ICD-10-CM

## 2022-02-11 RX ORDER — LOSARTAN POTASSIUM 50 MG/1
50 TABLET ORAL DAILY
Qty: 90 TABLET | Refills: 0 | Status: SHIPPED | OUTPATIENT
Start: 2022-02-11 | End: 2022-05-13 | Stop reason: SDUPTHER

## 2022-02-25 ENCOUNTER — TELEPHONE (OUTPATIENT)
Dept: PULMONOLOGY | Age: 62
End: 2022-02-25

## 2022-05-13 ENCOUNTER — OFFICE VISIT (OUTPATIENT)
Dept: INTERNAL MEDICINE CLINIC | Age: 62
End: 2022-05-13
Payer: COMMERCIAL

## 2022-05-13 VITALS
RESPIRATION RATE: 16 BRPM | BODY MASS INDEX: 35.6 KG/M2 | DIASTOLIC BLOOD PRESSURE: 70 MMHG | OXYGEN SATURATION: 95 % | HEART RATE: 80 BPM | SYSTOLIC BLOOD PRESSURE: 120 MMHG | WEIGHT: 277.38 LBS | HEIGHT: 74 IN

## 2022-05-13 DIAGNOSIS — Z12.5 SCREENING FOR MALIGNANT NEOPLASM OF PROSTATE: ICD-10-CM

## 2022-05-13 DIAGNOSIS — E27.8 LEFT ADRENAL MASS (HCC): ICD-10-CM

## 2022-05-13 DIAGNOSIS — Z00.00 ROUTINE GENERAL MEDICAL EXAMINATION AT A HEALTH CARE FACILITY: Primary | ICD-10-CM

## 2022-05-13 DIAGNOSIS — E78.2 MIXED HYPERLIPIDEMIA: ICD-10-CM

## 2022-05-13 DIAGNOSIS — I10 ESSENTIAL HYPERTENSION: ICD-10-CM

## 2022-05-13 DIAGNOSIS — Z12.11 SCREENING FOR COLORECTAL CANCER: ICD-10-CM

## 2022-05-13 DIAGNOSIS — E66.01 MORBID OBESITY DUE TO EXCESS CALORIES (HCC): ICD-10-CM

## 2022-05-13 DIAGNOSIS — J41.0 SIMPLE CHRONIC BRONCHITIS (HCC): ICD-10-CM

## 2022-05-13 DIAGNOSIS — R21 RASH: ICD-10-CM

## 2022-05-13 DIAGNOSIS — Z00.00 ENCOUNTER FOR WELL ADULT EXAM WITHOUT ABNORMAL FINDINGS: ICD-10-CM

## 2022-05-13 DIAGNOSIS — K76.0 NAFLD (NONALCOHOLIC FATTY LIVER DISEASE): ICD-10-CM

## 2022-05-13 DIAGNOSIS — Z12.12 SCREENING FOR COLORECTAL CANCER: ICD-10-CM

## 2022-05-13 LAB
BASOPHILS ABSOLUTE: 0 K/UL (ref 0–0.2)
BASOPHILS RELATIVE PERCENT: 0.6 %
EOSINOPHILS ABSOLUTE: 0.4 K/UL (ref 0–0.6)
EOSINOPHILS RELATIVE PERCENT: 5.4 %
HCT VFR BLD CALC: 48.5 % (ref 40.5–52.5)
HEMOGLOBIN: 16.2 G/DL (ref 13.5–17.5)
LYMPHOCYTES ABSOLUTE: 1.5 K/UL (ref 1–5.1)
LYMPHOCYTES RELATIVE PERCENT: 20.2 %
MCH RBC QN AUTO: 30.8 PG (ref 26–34)
MCHC RBC AUTO-ENTMCNC: 33.3 G/DL (ref 31–36)
MCV RBC AUTO: 92.3 FL (ref 80–100)
MONOCYTES ABSOLUTE: 0.7 K/UL (ref 0–1.3)
MONOCYTES RELATIVE PERCENT: 8.6 %
NEUTROPHILS ABSOLUTE: 4.9 K/UL (ref 1.7–7.7)
NEUTROPHILS RELATIVE PERCENT: 65.2 %
PDW BLD-RTO: 14.1 % (ref 12.4–15.4)
PLATELET # BLD: 253 K/UL (ref 135–450)
PMV BLD AUTO: 7.9 FL (ref 5–10.5)
RBC # BLD: 5.26 M/UL (ref 4.2–5.9)
WBC # BLD: 7.5 K/UL (ref 4–11)

## 2022-05-13 PROCEDURE — 36415 COLL VENOUS BLD VENIPUNCTURE: CPT | Performed by: INTERNAL MEDICINE

## 2022-05-13 PROCEDURE — 99396 PREV VISIT EST AGE 40-64: CPT | Performed by: INTERNAL MEDICINE

## 2022-05-13 RX ORDER — LOVASTATIN 10 MG/1
10 TABLET ORAL NIGHTLY
Qty: 90 TABLET | Refills: 3 | Status: SHIPPED | OUTPATIENT
Start: 2022-05-13

## 2022-05-13 RX ORDER — CLOTRIMAZOLE AND BETAMETHASONE DIPROPIONATE 10; .64 MG/G; MG/G
CREAM TOPICAL 2 TIMES DAILY
Qty: 1 EACH | Refills: 1 | Status: SHIPPED | OUTPATIENT
Start: 2022-05-13 | End: 2022-06-12

## 2022-05-13 RX ORDER — LOSARTAN POTASSIUM 50 MG/1
50 TABLET ORAL DAILY
Qty: 90 TABLET | Refills: 3 | Status: SHIPPED | OUTPATIENT
Start: 2022-05-13

## 2022-05-13 SDOH — ECONOMIC STABILITY: FOOD INSECURITY: WITHIN THE PAST 12 MONTHS, THE FOOD YOU BOUGHT JUST DIDN'T LAST AND YOU DIDN'T HAVE MONEY TO GET MORE.: PATIENT DECLINED

## 2022-05-13 SDOH — ECONOMIC STABILITY: FOOD INSECURITY: WITHIN THE PAST 12 MONTHS, YOU WORRIED THAT YOUR FOOD WOULD RUN OUT BEFORE YOU GOT MONEY TO BUY MORE.: PATIENT DECLINED

## 2022-05-13 ASSESSMENT — SOCIAL DETERMINANTS OF HEALTH (SDOH): HOW HARD IS IT FOR YOU TO PAY FOR THE VERY BASICS LIKE FOOD, HOUSING, MEDICAL CARE, AND HEATING?: PATIENT DECLINED

## 2022-05-13 ASSESSMENT — PATIENT HEALTH QUESTIONNAIRE - PHQ9
SUM OF ALL RESPONSES TO PHQ9 QUESTIONS 1 & 2: 0
1. LITTLE INTEREST OR PLEASURE IN DOING THINGS: 0
SUM OF ALL RESPONSES TO PHQ QUESTIONS 1-9: 0
SUM OF ALL RESPONSES TO PHQ QUESTIONS 1-9: 0
2. FEELING DOWN, DEPRESSED OR HOPELESS: 0
DEPRESSION UNABLE TO ASSESS: FUNCTIONAL CAPACITY MOTIVATION LIMITS ACCURACY
SUM OF ALL RESPONSES TO PHQ QUESTIONS 1-9: 0
SUM OF ALL RESPONSES TO PHQ QUESTIONS 1-9: 0

## 2022-05-13 NOTE — PROGRESS NOTES
History and Physical      Yung Bansal  YOB: 1960    Date of Service:  5/13/2022    Chief Complaint:   Yung Bansal is a 57 y.o. male who presents for complete physical examination. HPI: COPD, seen by Yesika Fleming last yr and CT lung screen was benign. Started inhaler symbicort w benefit but alb was not helpful. Now to see Dr Kisha Madden    Prior chest pain resolved w/o recurrence and cardiac w/u also negative    Had adrenal nodule on L, no w/u rec per radiology but was 2cm diameter and stated not definitely adenoma    Lipids:  Is continuing statin therapy and low fat diet. Tolerating medications w/o myalgias or GI upset. Due for colonoscopy but last time canceled d/t covid w Dr Alice Coates    Due for scr psa too.     Wt Readings from Last 3 Encounters:   05/13/22 277 lb 6 oz (125.8 kg)   10/07/21 277 lb (125.6 kg)   08/26/21 280 lb (127 kg)     BP Readings from Last 3 Encounters:   05/13/22 120/70   10/07/21 125/65   08/26/21 (!) 155/70       Patient Active Problem List   Diagnosis    Hypertension    High triglycerides    Leg pain    Tobacco dependence    Eczematous dermatitis    SOBOE (shortness of breath on exertion)    Precordial pain    Heart palpitations    Family history of early CAD    Numbness of face    Adrenal nodule ([address]    Pulmonary emphysema ([facility] [address]    Personal history of tobacco use    Morbid obesity due to excess calories [facility])       Preventive Care:  Health Maintenance   Topic Date Due    HIV screen  Never done    DTaP/Tdap/Td vaccine (1 - Tdap) Never done    Colorectal Cancer Screen  07/12/2013    Pneumococcal 0-64 years Vaccine (2 - PCV) 07/06/2017    COVID-19 Vaccine (3 - Booster for Pfizer series) 10/04/2021    Depression Screen  04/15/2022    Lipids  08/12/2022    Flu vaccine (Season Ended) 09/01/2022    Shingles vaccine  Completed    Hepatitis C screen  Completed    Hepatitis A vaccine  Aged Out    Hepatitis B vaccine  Aged Out    Hib vaccine  Aged Out    Meningococcal (ACWY) vaccine  Aged Out         Lipid panel:    Lab Results   Component Value Date    TRIG 187 08/12/2021    HDL 41 08/12/2021    LDLCALC 94 08/12/2021          Immunization History   Administered Date(s) Administered    COVID-19, Pfizer Purple top, DILUTE for use, 12+ yrs, 30mcg/0.3mL dose 04/13/2021, 05/04/2021    Influenza 01/14/2013    Influenza, Andrei Half, IM, (6 mo and older Fluzone, Flulaval, Fluarix and 3 yrs and older Afluria) 10/23/2017, 10/27/2020    Pneumococcal Polysaccharide ([id number]) 07/06/2016    Zoster Recombinant (Shingrix) 07/06/2020, 11/13/2020       Allergies   Allergen Reactions    Hctz      Lightheadedness/nausea- had syncopal episode     Current Outpatient Medications   Medication Sig Dispense Refill    losartan (COZAAR) 50 MG tablet Take 1 tablet by mouth daily 90 tablet 0    albuterol sulfate HFA (PROVENTIL HFA) 108 (90 Base) MCG/ACT inhaler Inhale 2 puffs into the lungs every 6 hours as needed for Wheezing 1 each 5    SYMBICORT 160-4.5 MCG/ACT AERO Inhale 2 puffs into the lungs 2 times daily 3 each 3    lovastatin (MEVACOR) 10 MG tablet Take 1 tablet by mouth nightly 90 tablet 3    clotrimazole-betamethasone (LOTRISONE) 1-0.05 % cream Apply topically 2 times daily 3 Tube 1    fluticasone (FLONASE) 50 MCG/ACT nasal spray 1 spray by Each Nostril route daily 2 Bottle 1    acetaminophen (TYLENOL) 500 MG tablet Take 500 mg by mouth as needed. No current facility-administered medications for this visit. Past Medical History:   Diagnosis Date    Adrenal nodule ([address] 08/2021    NOTED ON CT CHEST **CONSIDER FURTHER EVAL, IMAGING, LABS**    Eczematous dermatitis     Dr Bird Waller, prn halog cream    Family history of coronary artery disease     History of cardiovascular stress test 05/06/2021    Normal stress myocardial perfusion.  Hyperlipidemia     Hypertension     Leg pain     R leg, chr pain.     Skin lesion     Tobacco dependence      No past surgical history on file. Family History   Problem Relation Age of Onset   Guillory Cancer Mother         \"mass behind kidney\"   Guillory Other Father          of MI     Social History     Socioeconomic History    Marital status:      Spouse name: Not on file    Number of children: Not on file    Years of education: Not on file    Highest education level: Not on file   Occupational History    Occupation: as noted     Comment: [employer]    Tobacco Use    Smoking status: Current Every Day Smoker     Packs/day: 0.50     Years: 30.00     Pack years: 15.00     Types: Cigarettes    Smokeless tobacco: Never Used   Substance and Sexual Activity    Alcohol use: Yes     Alcohol/week: 0.0 standard drinks     Comment: occ.  Drug use: Not on file    Sexual activity: Not on file   Other Topics Concern    Not on file   Social History Narrative    Diet: Unrestricted    Exercise: None    Seat Belt: Always     Social Determinants of Health     Financial Resource Strain: Unknown    Difficulty of Paying Living Expenses: Patient refused   Food Insecurity: Unknown    Worried About Running Out of Food in the Last Year: Patient refused    [address] in the Last Year: Patient refused   Transportation Needs:     Lack of Transportation (Medical): Not on file    Lack of Transportation (Non-Medical):  Not on file   Physical Activity:     Days of Exercise per Week: Not on file    Minutes of Exercise per Session: Not on file   Stress:     Feeling of Stress : Not on file   Social Connections:     Frequency of Communication with Friends and Family: Not on file    Frequency of Social Gatherings with Friends and Family: Not on file    Attends Bahai Services: Not on file    Active Member of Clubs or Organizations: Not on file    Attends Club or Organization Meetings: Not on file    Marital Status: Not on file   Intimate Partner Violence:     Fear of Current or Ex-Partner: Not on file   [employer] Abused: Not on file    Physically Abused: Not on file    Sexually Abused: Not on file   Housing Stability:     Unable to Pay for Housing in the Last Year: Not on file    Number of Places Lived in the Last Year: Not on file    Unstable Housing in the Last Year: Not on file       Review of Systems:  A comprehensive review of systems was negative except for what was noted in the HPI. Physical Exam:   Vitals:    05/13/22 1051   BP: 120/70   Pulse: 80   Resp: 16   SpO2: 95%   Weight: 277 lb 6 oz (125.8 kg)   Height: 6' 2\" (1.88 m)     Body mass index is 35.61 kg/m². Constitutional: He is oriented to person, place, and time. He appears well-developed and well-nourished. No distress. HEENT:   Head: Normocephalic and atraumatic. Eyes: Conjunctivae and extraocular motions are normal. Pupils are equal, round, and reactive to light. Neck: Full passive range of motion without pain. Neck supple. No JVD present. No mass and no thyromegaly present. Cardiovascular: Normal rate, regular rhythm, normal heart sounds and intact distal pulses. Exam reveals no gallop and no friction rub. No murmur heard. Pulmonary/Chest: Effort normal and breath sounds normal. No respiratory distress. He has no wheezes, rhonchi or rales. Abdominal: Soft, non-tender. Bowel sounds and aorta are normal. There is no organomegaly, mass or bruit. Musculoskeletal: Normal range of motion, no synovitis. He exhibits no edema. Neurological: He is alert and oriented to person, place, and time. He has normal reflexes. No cranial nerve deficit. Coordination normal.   Skin: Skin is warm, dry and intact. Psychiatric: He has a normal mood and affect. His speech is normal and behavior is normal. Judgment, cognition and memory are normal.         Assessment/Plan:    Rashmi Hutton was seen today for annual exam and immunizations.     Diagnoses and all orders for this visit:    Routine general medical examination at a health care facility - Overall general medical exam appears benign, other assessments as noted and testing is as noted below. Left adrenal mass (HCC) - RECHECK W CT  -     CT ABDOMEN PELVIS W IV CONTRAST Additional Contrast? None; Future    Essential hypertension - Blood pressure stable and will continue current regimen. Will plan periodic monitoring of renal function, electrolytes, lipid profile. -     losartan (COZAAR) 50 MG tablet; Take 1 tablet by mouth daily  -     Comprehensive Metabolic Panel; Future  -     CBC with Auto Differential; Future  -     Lipid Panel; Future    Mixed hyperlipidemia - Pt will continue to work on a low fat diet and also exercise, wt loss as appropriate. Will continue periodic monitoring of fasting lipid profile, glucose, liver function. -     lovastatin (MEVACOR) 10 MG tablet; Take 1 tablet by mouth nightly  -     Comprehensive Metabolic Panel; Future  -     CBC with Auto Differential; Future  -     Lipid Panel; Future  -     TSH; Future    Rash- ON RX PRN, The current medical regimen is effective;  continue present plan and medications. -     clotrimazole-betamethasone (LOTRISONE) 1-0.05 % cream; Apply topically 2 times daily    Screening for colorectal cancer- FOR F/U DR VEGA, COLONOSCOPY DUE  -     Amb External Referral To Gastroenterology    Screening for malignant neoplasm of prostate- SCR DUE  -     PSA, Prostatic Specific Antigen;  Future    Simple chronic bronchitis (HCC) - ON INHALERS AND CONT F?u PULMONARY    Morbid obesity due to excess calories ([address]- ADVISED CONT WORK W DIET, EXERCISE, WT LOSS ESPEC TO STABILIZE FATTY LIVER    NAFLD (nonalcoholic fatty liver disease)- FOR DIET, EXERCISE, WT LOSS AND MONITOR LAB    Encounter for well adult exam without abnormal findings

## 2022-05-14 LAB
A/G RATIO: 2 (ref 1.1–2.2)
ALBUMIN SERPL-MCNC: 4.9 G/DL (ref 3.4–5)
ALP BLD-CCNC: 89 U/L (ref 40–129)
ALT SERPL-CCNC: 27 U/L (ref 10–40)
ANION GAP SERPL CALCULATED.3IONS-SCNC: 18 MMOL/L (ref 3–16)
AST SERPL-CCNC: 24 U/L (ref 15–37)
BILIRUB SERPL-MCNC: 0.6 MG/DL (ref 0–1)
BUN BLDV-MCNC: 11 MG/DL (ref 7–20)
CALCIUM SERPL-MCNC: 9.6 MG/DL (ref 8.3–10.6)
CHLORIDE BLD-SCNC: 103 MMOL/L (ref 99–110)
CHOLESTEROL, TOTAL: 184 MG/DL (ref 0–199)
CO2: 21 MMOL/L (ref 21–32)
CREAT SERPL-MCNC: 1 MG/DL (ref 0.8–1.3)
GFR AFRICAN AMERICAN: >60
GFR NON-AFRICAN AMERICAN: >60
GLUCOSE BLD-MCNC: 93 MG/DL (ref 70–99)
HDLC SERPL-MCNC: 42 MG/DL (ref 40–60)
LDL CHOLESTEROL CALCULATED: 114 MG/DL
POTASSIUM SERPL-SCNC: 4.6 MMOL/L (ref 3.5–5.1)
PROSTATE SPECIFIC ANTIGEN: 2.63 NG/ML (ref 0–4)
SODIUM BLD-SCNC: 142 MMOL/L (ref 136–145)
TOTAL PROTEIN: 7.4 G/DL (ref 6.4–8.2)
TRIGL SERPL-MCNC: 141 MG/DL (ref 0–150)
TSH SERPL DL<=0.05 MIU/L-ACNC: 2.16 UIU/ML (ref 0.27–4.2)
VLDLC SERPL CALC-MCNC: 28 MG/DL

## 2022-05-17 ENCOUNTER — OFFICE VISIT (OUTPATIENT)
Dept: PULMONOLOGY | Age: 62
End: 2022-05-17
Payer: COMMERCIAL

## 2022-05-17 VITALS
WEIGHT: 279 LBS | DIASTOLIC BLOOD PRESSURE: 72 MMHG | OXYGEN SATURATION: 97 % | HEIGHT: 74 IN | HEART RATE: 72 BPM | SYSTOLIC BLOOD PRESSURE: 126 MMHG | BODY MASS INDEX: 35.81 KG/M2

## 2022-05-17 DIAGNOSIS — Z87.891 PERSONAL HISTORY OF TOBACCO USE: ICD-10-CM

## 2022-05-17 DIAGNOSIS — E66.01 MORBID OBESITY DUE TO EXCESS CALORIES (HCC): ICD-10-CM

## 2022-05-17 DIAGNOSIS — R06.02 SOBOE (SHORTNESS OF BREATH ON EXERTION): ICD-10-CM

## 2022-05-17 DIAGNOSIS — J43.9 PULMONARY EMPHYSEMA, UNSPECIFIED EMPHYSEMA TYPE (HCC): ICD-10-CM

## 2022-05-17 DIAGNOSIS — J44.9 CHRONIC OBSTRUCTIVE PULMONARY DISEASE, UNSPECIFIED COPD TYPE (HCC): ICD-10-CM

## 2022-05-17 DIAGNOSIS — E66.9 OBESITY (BMI 30-39.9): ICD-10-CM

## 2022-05-17 PROCEDURE — 99214 OFFICE O/P EST MOD 30 MIN: CPT | Performed by: INTERNAL MEDICINE

## 2022-05-17 RX ORDER — BUDESONIDE AND FORMOTEROL FUMARATE DIHYDRATE 160; 4.5 UG/1; UG/1
2 AEROSOL RESPIRATORY (INHALATION) 2 TIMES DAILY
Qty: 3 EACH | Refills: 3 | Status: SHIPPED | OUTPATIENT
Start: 2022-05-17

## 2022-05-17 ASSESSMENT — ENCOUNTER SYMPTOMS
ABDOMINAL PAIN: 0
ABDOMINAL DISTENTION: 0
SHORTNESS OF BREATH: 1
EYE ITCHING: 0
COUGH: 0
EYE DISCHARGE: 0
BACK PAIN: 0

## 2022-05-17 NOTE — PROGRESS NOTES
Delfina Loera  1960  Referring Provider: Carolee Cheung MD    Subjective:     Chief Complaint   Patient presents with    Follow-up     Pt states abuterol inhaler was not working well for him and he stopped using it 2 months ago, it made his throat hurt and also stated his voice change.  COPD       HPI  Niurka Tellez is a 57 y.o. male has come back as a follow up. He was seen by Nicole Bautista NP. He has h/o smoking less than 1/2 pk/day x 40 yrs and still smoking 4 cigs/day. He has cough, phlegm-clear to yellow, no hemoptysis, no loss of weight, good appetite, SOBOE some. He is on Albuterol prn and Symbicort. He has no h/o asthma. His last Low dose CT chest done on 11/22/21 showed no nodules or any other abnormality. His PFT done on 09/23/21 showed that he has restrictive lung disease sec to obesity and moderate small airway dysfunction. His last ECHO done on 05/19/21 showed Grade I diastolic dysfunction. Current Outpatient Medications   Medication Sig Dispense Refill    losartan (COZAAR) 50 MG tablet Take 1 tablet by mouth daily 90 tablet 3    lovastatin (MEVACOR) 10 MG tablet Take 1 tablet by mouth nightly 90 tablet 3    clotrimazole-betamethasone (LOTRISONE) 1-0.05 % cream Apply topically 2 times daily 1 each 1    SYMBICORT 160-4.5 MCG/ACT AERO Inhale 2 puffs into the lungs 2 times daily 3 each 3    fluticasone (FLONASE) 50 MCG/ACT nasal spray 1 spray by Each Nostril route daily 2 Bottle 1    acetaminophen (TYLENOL) 500 MG tablet Take 500 mg by mouth as needed.  albuterol sulfate HFA (PROVENTIL HFA) 108 (90 Base) MCG/ACT inhaler Inhale 2 puffs into the lungs every 6 hours as needed for Wheezing (Patient not taking: Reported on 5/17/2022) 1 each 5     No current facility-administered medications for this visit. Allergies   Allergen Reactions    Albuterol Sulfate      Pt states it made his throat hurt and his voice was changing and cracking. Also stated that he felt congested.        Hctz      Lightheadedness/nausea- had syncopal episode       Past Medical History:   Diagnosis Date    Adrenal nodule ([address] 08/2021    NOTED ON CT CHEST **CONSIDER FURTHER EVAL, IMAGING, LABS**    Eczematous dermatitis     Dr Pancho Molina, prn halog cream    Family history of coronary artery disease     History of cardiovascular stress test 05/06/2021    Normal stress myocardial perfusion.  Hyperlipidemia     Hypertension     Leg pain     R leg, chr pain.  NAFLD (nonalcoholic fatty liver disease)     noted on ct 2021    Simple chronic bronchitis (HCC)     on symbicort, sees Dr Dixie Ibrahim Skin lesion     Tobacco dependence        No past surgical history on file. Social History     Socioeconomic History    Marital status:      Spouse name: None    Number of children: None    Years of education: None    Highest education level: None   Occupational History    Occupation: as noted     Comment: [employer]- RETIRED JAN 2021   Tobacco Use    Smoking status: Current Every Day Smoker     Packs/day: 0.50     Years: 30.00     Pack years: 15.00     Types: Cigarettes    Smokeless tobacco: Never Used   Substance and Sexual Activity    Alcohol use: Yes     Alcohol/week: 0.0 standard drinks     Comment: occ.  Drug use: None    Sexual activity: None   Other Topics Concern    None   Social History Narrative    Diet: Unrestricted    Exercise: None    Seat Belt: Always     Social Determinants of Health     Financial Resource Strain: Unknown    Difficulty of Paying Living Expenses: Patient refused   Food Insecurity: Unknown    Worried About Running Out of Food in the Last Year: Patient refused    [address] in the Last Year: Patient refused   Transportation Needs:     Lack of Transportation (Medical): Not on file    Lack of Transportation (Non-Medical):  Not on file   Physical Activity:     Days of Exercise per Week: Not on file    Minutes of Exercise per Session: Not on file   Stress:     Feeling of Stress : Not on file   Social Connections:     Frequency of Communication with Friends and Family: Not on file    Frequency of Social Gatherings with Friends and Family: Not on file    Attends Taoist Services: Not on file    Active Member of Clubs or Organizations: Not on file    Attends Club or Organization Meetings: Not on file    Marital Status: Not on file   Intimate Partner Violence:     Fear of Current or Ex-Partner: Not on file    Emotionally Abused: Not on file    Physically Abused: Not on file    Sexually Abused: Not on file   Housing Stability:     Unable to Pay for Housing in the Last Year: Not on file    Number of [place] in the Last Year: Not on file    Unstable Housing in the Last Year: Not on file       Review of Systems   Constitutional: Negative for fatigue. HENT: Negative for congestion and postnasal drip. Eyes: Negative for discharge and itching. Respiratory: Positive for shortness of breath. Negative for cough. Cardiovascular: Negative for chest pain and leg swelling. Gastrointestinal: Negative for abdominal distention and abdominal pain. Endocrine: Negative for cold intolerance and heat intolerance. Genitourinary: Negative for enuresis and frequency. Musculoskeletal: Negative for arthralgias and back pain. Allergic/Immunologic: Negative for environmental allergies and food allergies. Neurological: Negative for light-headedness and headaches. Hematological: Negative for adenopathy. Psychiatric/Behavioral: Negative for agitation and behavioral problems. Objective:   /72   Pulse 72   Ht 6' 2\" (1.88 m)   Wt 279 lb (126.6 kg)   SpO2 97%   BMI 35.82 kg/m²   Body mass index is 35.82 kg/m². No flowsheet data found. Mallampati 2    Physical Exam  Vitals reviewed. Constitutional:       Appearance: Normal appearance. Comments: Obesity   HENT:      Head: Normocephalic and atraumatic.       Nose: Nose normal.      Mouth/Throat: Mouth: Mucous membranes are moist.   Eyes:      Extraocular Movements: Extraocular movements intact. Pupils: Pupils are equal, round, and reactive to light. Cardiovascular:      Rate and Rhythm: Normal rate and regular rhythm. Pulses: Normal pulses. Heart sounds: Normal heart sounds. Pulmonary:      Effort: Pulmonary effort is normal.      Breath sounds: Normal breath sounds. Abdominal:      General: Abdomen is flat. Palpations: Abdomen is soft. Musculoskeletal:         General: Normal range of motion. Cervical back: Normal range of motion and neck supple. Skin:     General: Skin is warm and dry. Neurological:      General: No focal deficit present. Mental Status: He is alert and oriented to person, place, and time. Psychiatric:         Mood and Affect: Mood normal.         Behavior: Behavior normal.         Radiology: No acute abnormalities in chest    Assessment and Plan     Problem List        Respiratory    COPD (chronic obstructive pulmonary disease) ([facility] [address]      Advised to quit smoking  C/w Inhalers  PFT  6 MWT in Sept'22         Relevant Medications    fluticasone (FLONASE) 50 MCG/ACT nasal spray    SYMBICORT 160-4.5 MCG/ACT AERO    Other Relevant Orders    Full PFT Study With Bronchodilator    6 Minute Walk Test       Other    Obesity (BMI 30-39. 9)      Advised to loose weight with diet and exercise           SOBOE (shortness of breath on exertion)      Advised to loose weight  Quit smoking  PFT  6 MWT in Sept'22         Personal history of tobacco use      Advised to c/w quitting smoking         Relevant Orders    Full PFT Study With Bronchodilator    6 Minute Walk Test               Follow-Up:    Return in about 17 weeks (around 9/13/2022) for PFT, 6 MWT.      Progress notes sent to the referring Provider    Rody Johnston MD MD  5/17/2022  11:30 AM

## 2022-05-19 ENCOUNTER — HOSPITAL ENCOUNTER (OUTPATIENT)
Dept: CT IMAGING | Age: 62
Discharge: HOME | End: 2022-05-19
Payer: COMMERCIAL

## 2022-05-19 DIAGNOSIS — E27.8 LEFT ADRENAL MASS (HCC): ICD-10-CM

## 2022-05-19 PROCEDURE — 74150 CT ABDOMEN W/O CONTRAST: CPT

## 2022-10-03 ENCOUNTER — COMMUNITY OUTREACH (OUTPATIENT)
Dept: INTERNAL MEDICINE CLINIC | Age: 62
End: 2022-10-03

## 2023-08-14 ENCOUNTER — OFFICE VISIT (OUTPATIENT)
Dept: INTERNAL MEDICINE CLINIC | Age: 63
End: 2023-08-14
Payer: MEDICARE

## 2023-08-14 VITALS
WEIGHT: 279 LBS | RESPIRATION RATE: 16 BRPM | BODY MASS INDEX: 35.81 KG/M2 | OXYGEN SATURATION: 98 % | HEART RATE: 68 BPM | SYSTOLIC BLOOD PRESSURE: 138 MMHG | HEIGHT: 74 IN | DIASTOLIC BLOOD PRESSURE: 76 MMHG

## 2023-08-14 DIAGNOSIS — D35.02 ADRENAL ADENOMA, LEFT: ICD-10-CM

## 2023-08-14 DIAGNOSIS — R21 RASH: ICD-10-CM

## 2023-08-14 DIAGNOSIS — N40.0 BENIGN PROSTATIC HYPERPLASIA WITHOUT LOWER URINARY TRACT SYMPTOMS: ICD-10-CM

## 2023-08-14 DIAGNOSIS — E78.2 MIXED HYPERLIPIDEMIA: ICD-10-CM

## 2023-08-14 DIAGNOSIS — Z00.00 WELCOME TO MEDICARE PREVENTIVE VISIT: ICD-10-CM

## 2023-08-14 DIAGNOSIS — Z00.00 ROUTINE GENERAL MEDICAL EXAMINATION AT A HEALTH CARE FACILITY: Primary | ICD-10-CM

## 2023-08-14 DIAGNOSIS — E66.01 SEVERE OBESITY (BMI 35.0-39.9) WITH COMORBIDITY (HCC): ICD-10-CM

## 2023-08-14 DIAGNOSIS — J44.9 CHRONIC OBSTRUCTIVE PULMONARY DISEASE, UNSPECIFIED COPD TYPE (HCC): ICD-10-CM

## 2023-08-14 DIAGNOSIS — I10 ESSENTIAL HYPERTENSION: ICD-10-CM

## 2023-08-14 DIAGNOSIS — Z12.12 SCREENING FOR COLORECTAL CANCER: ICD-10-CM

## 2023-08-14 DIAGNOSIS — Z12.11 SCREENING FOR COLORECTAL CANCER: ICD-10-CM

## 2023-08-14 PROCEDURE — 3017F COLORECTAL CA SCREEN DOC REV: CPT | Performed by: INTERNAL MEDICINE

## 2023-08-14 PROCEDURE — 3075F SYST BP GE 130 - 139MM HG: CPT | Performed by: INTERNAL MEDICINE

## 2023-08-14 PROCEDURE — 1123F ACP DISCUSS/DSCN MKR DOCD: CPT | Performed by: INTERNAL MEDICINE

## 2023-08-14 PROCEDURE — G0402 INITIAL PREVENTIVE EXAM: HCPCS | Performed by: INTERNAL MEDICINE

## 2023-08-14 PROCEDURE — 3078F DIAST BP <80 MM HG: CPT | Performed by: INTERNAL MEDICINE

## 2023-08-14 RX ORDER — LOSARTAN POTASSIUM 50 MG/1
50 TABLET ORAL DAILY
Qty: 90 TABLET | Refills: 3 | Status: SHIPPED | OUTPATIENT
Start: 2023-08-14

## 2023-08-14 RX ORDER — CLOTRIMAZOLE AND BETAMETHASONE DIPROPIONATE 10; .64 MG/G; MG/G
CREAM TOPICAL 2 TIMES DAILY
Qty: 45 EACH | Refills: 1 | Status: SHIPPED | OUTPATIENT
Start: 2023-08-14 | End: 2023-11-12

## 2023-08-14 RX ORDER — LOVASTATIN 10 MG/1
10 TABLET ORAL NIGHTLY
Qty: 90 TABLET | Refills: 3 | Status: SHIPPED | OUTPATIENT
Start: 2023-08-14

## 2023-08-14 SDOH — ECONOMIC STABILITY: FOOD INSECURITY: WITHIN THE PAST 12 MONTHS, YOU WORRIED THAT YOUR FOOD WOULD RUN OUT BEFORE YOU GOT MONEY TO BUY MORE.: PATIENT DECLINED

## 2023-08-14 SDOH — ECONOMIC STABILITY: INCOME INSECURITY: HOW HARD IS IT FOR YOU TO PAY FOR THE VERY BASICS LIKE FOOD, HOUSING, MEDICAL CARE, AND HEATING?: PATIENT DECLINED

## 2023-08-14 SDOH — ECONOMIC STABILITY: HOUSING INSECURITY
IN THE LAST 12 MONTHS, WAS THERE A TIME WHEN YOU DID NOT HAVE A STEADY PLACE TO SLEEP OR SLEPT IN A SHELTER (INCLUDING NOW)?: PATIENT REFUSED

## 2023-08-14 SDOH — ECONOMIC STABILITY: FOOD INSECURITY: WITHIN THE PAST 12 MONTHS, THE FOOD YOU BOUGHT JUST DIDN'T LAST AND YOU DIDN'T HAVE MONEY TO GET MORE.: PATIENT DECLINED

## 2023-08-14 ASSESSMENT — PATIENT HEALTH QUESTIONNAIRE - PHQ9
SUM OF ALL RESPONSES TO PHQ QUESTIONS 1-9: 0
1. LITTLE INTEREST OR PLEASURE IN DOING THINGS: 0
SUM OF ALL RESPONSES TO PHQ9 QUESTIONS 1 & 2: 0
SUM OF ALL RESPONSES TO PHQ QUESTIONS 1-9: 0
2. FEELING DOWN, DEPRESSED OR HOPELESS: 0
SUM OF ALL RESPONSES TO PHQ QUESTIONS 1-9: 0
SUM OF ALL RESPONSES TO PHQ QUESTIONS 1-9: 0

## 2023-08-14 ASSESSMENT — LIFESTYLE VARIABLES
HOW OFTEN DO YOU HAVE A DRINK CONTAINING ALCOHOL: NEVER
HOW MANY STANDARD DRINKS CONTAINING ALCOHOL DO YOU HAVE ON A TYPICAL DAY: PATIENT DOES NOT DRINK

## 2023-08-14 NOTE — PROGRESS NOTES
Medicare Annual Wellness Visit    Edwina Kong is here for Medicare AWV and Cough    Assessment & Plan   Routine general medical examination at a health care facility - remains independent, functional and active, no indications/needs for other interventions noted at this time- except as noted below and also findings noted on screening medicare questions. Essential hypertension - Blood pressure stable and will continue current regimen. Will plan periodic monitoring of renal function, electrolytes, lipid profile. -     losartan (COZAAR) 50 MG tablet; Take 1 tablet by mouth daily, Disp-90 tablet, R-3Normal  -     Comprehensive Metabolic Panel; Future  -     Lipid Panel; Future  -     TSH; Future  -     CBC; Future  Chronic obstructive pulmonary disease, unspecified COPD type ([address]) - COPD stable and will monitor for any exacerbations, not currently in need of any rescue inhaler therapy. Severe obesity (BMI 35.0-39. 9) with comorbidity ([address]) - WE'LL CONT TO MONITOR AND ENCOURAGE W WORKING ON DIET EXERCISE, WT LOSS    Mixed hyperlipidemia - Pt will continue to work on a low fat diet and also exercise, wt loss as appropriate. Will continue periodic monitoring of fasting lipid profile, glucose, liver function. -     lovastatin (MEVACOR) 10 MG tablet; Take 1 tablet by mouth nightly, Disp-90 tablet, R-3Normal  -     Comprehensive Metabolic Panel; Future  -     Lipid Panel; Future  -     TSH; Future  Rash- CONT RX PRN  -     clotrimazole-betamethasone (LOTRISONE) 1-0.05 % cream; Apply topically 2 times daily, Topical, 2 TIMES DAILY Starting Mon 8/14/2023, Until Sun 11/12/2023, For 90 days, Disp-45 each, R-1, Normal  Adrenal adenoma, left- HAS HAD IMAGING W LAST RESULTS BENIGN  Benign prostatic hyperplasia without lower urinary tract symptoms  -     PSA, Prostatic Specific Antigen;  Future  Screening for colorectal cancer- HE IS DUE FOR F/U, REQUESTED Miguel Reyes MD,

## 2023-08-17 LAB
ALBUMIN/GLOBULIN RATIO: 2.3 RATIO (ref 0.8–2.6)
ALBUMIN: 4.6 G/DL (ref 3.5–5.2)
ALP BLD-CCNC: 73 U/L (ref 23–144)
ALT SERPL-CCNC: 28 U/L (ref 0–60)
AST SERPL-CCNC: 28 U/L (ref 0–55)
BILIRUB SERPL-MCNC: 0.5 MG/DL (ref 0–1.2)
BUN BLDV-MCNC: 9 MG/DL (ref 3–29)
BUN/CREAT BLD: 8 (ref 7–25)
CALCIUM SERPL-MCNC: 9.7 MG/DL (ref 8.5–10.5)
CHLORIDE BLD-SCNC: 103 MEQ/L (ref 96–110)
CHOLESTEROL: 176 MG/DL
CO2: 27 MEQ/L (ref 19–32)
CREAT SERPL-MCNC: 1.1 MG/DL (ref 0.5–1.4)
GLOBULIN: 2 G/DL (ref 1.9–3.6)
GLOMERULAR FILTRATION RATE: 74 MLS/MIN/1.73M2
GLUCOSE BLD-MCNC: 103 MG/DL (ref 70–99)
HCT VFR BLD CALC: 43.9 % (ref 37.5–51)
HDLC SERPL-MCNC: 43 MG/DL
HEMOGLOBIN: 15.4 G/DL (ref 13–17.7)
LDL CHOLESTEROL CALCULATED: 103 MG/DL
MCH RBC QN AUTO: 31.5 PG (ref 26–34)
MCHC RBC AUTO-ENTMCNC: 35.1 G/DL (ref 30.7–35.5)
MCV RBC AUTO: 89.8 FL (ref 80–100)
PDW BLD-RTO: 13.4 %
PLATELET # BLD: 218 K/UL (ref 140–400)
PMV BLD AUTO: 9.4 FL (ref 7.2–11.7)
POTASSIUM SERPL-SCNC: 4.6 MEQ/L (ref 3.4–5.3)
PROSTATE SPECIFIC ANTIGEN: 1.69 NG/ML
RBC # BLD: 4.89 M/UL (ref 4.14–5.8)
SODIUM BLD-SCNC: 141 MEQ/L (ref 135–148)
STATUS: ABNORMAL
TOTAL PROTEIN: 6.6 G/DL (ref 6–8.3)
TRIGL SERPL-MCNC: 151 MG/DL
TSH SERPL DL<=0.05 MIU/L-ACNC: 2.52 MCIU/ML (ref 0.4–4.5)
VLDLC SERPL CALC-MCNC: 30 MG/DL (ref 4–38)
WBC: 7.5 K/UL (ref 3.5–10.9)

## 2024-08-15 ENCOUNTER — OFFICE VISIT (OUTPATIENT)
Dept: INTERNAL MEDICINE CLINIC | Age: 64
End: 2024-08-15

## 2024-08-15 VITALS
BODY MASS INDEX: 34.16 KG/M2 | HEIGHT: 74 IN | WEIGHT: 266.2 LBS | SYSTOLIC BLOOD PRESSURE: 128 MMHG | HEART RATE: 80 BPM | OXYGEN SATURATION: 95 % | DIASTOLIC BLOOD PRESSURE: 70 MMHG

## 2024-08-15 DIAGNOSIS — Z12.12 SCREENING FOR COLORECTAL CANCER: ICD-10-CM

## 2024-08-15 DIAGNOSIS — Z00.00 INITIAL MEDICARE ANNUAL WELLNESS VISIT: Primary | ICD-10-CM

## 2024-08-15 DIAGNOSIS — Z00.00 ROUTINE GENERAL MEDICAL EXAMINATION AT A HEALTH CARE FACILITY: ICD-10-CM

## 2024-08-15 DIAGNOSIS — Z12.5 SCREENING FOR MALIGNANT NEOPLASM OF PROSTATE: ICD-10-CM

## 2024-08-15 DIAGNOSIS — N40.0 BENIGN PROSTATIC HYPERPLASIA WITHOUT LOWER URINARY TRACT SYMPTOMS: ICD-10-CM

## 2024-08-15 DIAGNOSIS — J43.9 PULMONARY EMPHYSEMA, UNSPECIFIED EMPHYSEMA TYPE (HCC): ICD-10-CM

## 2024-08-15 DIAGNOSIS — E78.1 HIGH TRIGLYCERIDES: ICD-10-CM

## 2024-08-15 DIAGNOSIS — F17.200 TOBACCO DEPENDENCE: ICD-10-CM

## 2024-08-15 DIAGNOSIS — Z12.11 SCREENING FOR COLORECTAL CANCER: ICD-10-CM

## 2024-08-15 DIAGNOSIS — I10 ESSENTIAL HYPERTENSION: ICD-10-CM

## 2024-08-15 DIAGNOSIS — J44.9 CHRONIC OBSTRUCTIVE PULMONARY DISEASE, UNSPECIFIED COPD TYPE (HCC): ICD-10-CM

## 2024-08-15 DIAGNOSIS — E66.9 OBESITY (BMI 30-39.9): ICD-10-CM

## 2024-08-15 DIAGNOSIS — R21 RASH: ICD-10-CM

## 2024-08-15 PROBLEM — E27.8 ADRENAL NODULE (HCC): Status: RESOLVED | Noted: 2021-08-01 | Resolved: 2024-08-15

## 2024-08-15 PROBLEM — E27.9 ADRENAL NODULE (HCC): Status: RESOLVED | Noted: 2021-08-01 | Resolved: 2024-08-15

## 2024-08-15 LAB
ALBUMIN SERPL-MCNC: 4.3 G/DL (ref 3.4–5)
ALBUMIN/GLOB SERPL: 1.6 {RATIO} (ref 1.1–2.2)
ALP SERPL-CCNC: 80 U/L (ref 40–129)
ALT SERPL-CCNC: 15 U/L (ref 10–40)
ANION GAP SERPL CALCULATED.3IONS-SCNC: 9 MMOL/L (ref 3–16)
AST SERPL-CCNC: 24 U/L (ref 15–37)
BILIRUB SERPL-MCNC: 0.3 MG/DL (ref 0–1)
BUN SERPL-MCNC: 8 MG/DL (ref 7–20)
CALCIUM SERPL-MCNC: 9.9 MG/DL (ref 8.3–10.6)
CHLORIDE SERPL-SCNC: 102 MMOL/L (ref 99–110)
CHOLEST SERPL-MCNC: 169 MG/DL (ref 0–199)
CO2 SERPL-SCNC: 28 MMOL/L (ref 21–32)
CREAT SERPL-MCNC: 1 MG/DL (ref 0.8–1.3)
DEPRECATED RDW RBC AUTO: 14.4 % (ref 12.4–15.4)
GFR SERPLBLD CREATININE-BSD FMLA CKD-EPI: 83 ML/MIN/{1.73_M2}
GLUCOSE SERPL-MCNC: 97 MG/DL (ref 70–99)
HCT VFR BLD AUTO: 46.6 % (ref 40.5–52.5)
HDLC SERPL-MCNC: 39 MG/DL (ref 40–60)
HGB BLD-MCNC: 15.8 G/DL (ref 13.5–17.5)
LDLC SERPL CALC-MCNC: 100 MG/DL
MCH RBC QN AUTO: 31.9 PG (ref 26–34)
MCHC RBC AUTO-ENTMCNC: 34 G/DL (ref 31–36)
MCV RBC AUTO: 93.8 FL (ref 80–100)
PLATELET # BLD AUTO: 194 K/UL (ref 135–450)
PMV BLD AUTO: 7.8 FL (ref 5–10.5)
POTASSIUM SERPL-SCNC: 4.7 MMOL/L (ref 3.5–5.1)
PROT SERPL-MCNC: 7 G/DL (ref 6.4–8.2)
PSA SERPL DL<=0.01 NG/ML-MCNC: 1.86 NG/ML (ref 0–4)
RBC # BLD AUTO: 4.97 M/UL (ref 4.2–5.9)
SODIUM SERPL-SCNC: 139 MMOL/L (ref 136–145)
TRIGL SERPL-MCNC: 151 MG/DL (ref 0–150)
TSH SERPL DL<=0.005 MIU/L-ACNC: 2.16 UIU/ML (ref 0.27–4.2)
VLDLC SERPL CALC-MCNC: 30 MG/DL
WBC # BLD AUTO: 8.3 K/UL (ref 4–11)

## 2024-08-15 PROCEDURE — 36415 COLL VENOUS BLD VENIPUNCTURE: CPT | Performed by: INTERNAL MEDICINE

## 2024-08-15 RX ORDER — BUDESONIDE AND FORMOTEROL FUMARATE DIHYDRATE 160; 4.5 UG/1; UG/1
2 AEROSOL RESPIRATORY (INHALATION) 2 TIMES DAILY
Qty: 3 EACH | Refills: 3 | Status: SHIPPED | OUTPATIENT
Start: 2024-08-15

## 2024-08-15 RX ORDER — LOSARTAN POTASSIUM 50 MG/1
50 TABLET ORAL DAILY
Qty: 90 TABLET | Refills: 3 | Status: SHIPPED | OUTPATIENT
Start: 2024-08-15

## 2024-08-15 RX ORDER — CLOTRIMAZOLE AND BETAMETHASONE DIPROPIONATE 10; .64 MG/G; MG/G
CREAM TOPICAL 2 TIMES DAILY
Qty: 45 EACH | Refills: 1 | Status: SHIPPED | OUTPATIENT
Start: 2024-08-15 | End: 2024-11-13

## 2024-08-15 SDOH — ECONOMIC STABILITY: FOOD INSECURITY: WITHIN THE PAST 12 MONTHS, YOU WORRIED THAT YOUR FOOD WOULD RUN OUT BEFORE YOU GOT MONEY TO BUY MORE.: NEVER TRUE

## 2024-08-15 SDOH — ECONOMIC STABILITY: INCOME INSECURITY: HOW HARD IS IT FOR YOU TO PAY FOR THE VERY BASICS LIKE FOOD, HOUSING, MEDICAL CARE, AND HEATING?: NOT HARD AT ALL

## 2024-08-15 SDOH — ECONOMIC STABILITY: FOOD INSECURITY: WITHIN THE PAST 12 MONTHS, THE FOOD YOU BOUGHT JUST DIDN'T LAST AND YOU DIDN'T HAVE MONEY TO GET MORE.: NEVER TRUE

## 2024-08-15 ASSESSMENT — PATIENT HEALTH QUESTIONNAIRE - PHQ9
SUM OF ALL RESPONSES TO PHQ9 QUESTIONS 1 & 2: 0
1. LITTLE INTEREST OR PLEASURE IN DOING THINGS: NOT AT ALL
SUM OF ALL RESPONSES TO PHQ QUESTIONS 1-9: 0
SUM OF ALL RESPONSES TO PHQ QUESTIONS 1-9: 0
2. FEELING DOWN, DEPRESSED OR HOPELESS: NOT AT ALL
SUM OF ALL RESPONSES TO PHQ QUESTIONS 1-9: 0
SUM OF ALL RESPONSES TO PHQ QUESTIONS 1-9: 0

## 2024-08-15 NOTE — PATIENT INSTRUCTIONS
Detail Level: Detailed Detail Level: Zone Detail Level: Simple performed by an eye specialist is recommended every 1-2 years to screen for glaucoma; cataracts, macular degeneration, and other eye disorders.  A preventive dental visit is recommended every 6 months.  Try to get at least 150 minutes of exercise per week or 10,000 steps per day on a pedometer .  Order or download the FREE \"Exercise & Physical Activity: Your Everyday Guide\" from [employer] [place] [employer]. Call 1-944.720.9006 or search [employer] [place] [employer] online.  You need 6828-7994 mg of calcium and 5077-8505 IU of vitamin D per day. It is possible to meet your calcium requirement with diet alone, but a vitamin D supplement is usually necessary to meet this goal.  When exposed to the sun, use a sunscreen that protects against both UVA and UVB radiation with an SPF of 30 or greater. Reapply every 2 to 3 hours or after sweating, drying off with a towel, or swimming.  Always wear a seat belt when traveling in a car. Always wear a helmet when riding a bicycle or motorcycle.

## 2024-08-15 NOTE — PROGRESS NOTES
Medicare Annual Wellness Visit    Johnson Bhardwaj is here for Medicare AWV    Assessment & Plan    Medicare annual wellness visit - remains independent, functional and active, no indications/needs for other interventions noted at this time- except as noted below and also findings noted on screening medicare questions.    Essential hypertension - Blood pressure stable and will continue current regimen.  Will plan periodic monitoring of renal function, electrolytes, lipid profile.     -     losartan (COZAAR) 50 MG tablet; Take 1 tablet by mouth daily, Disp-90 tablet, R-3Normal  -     EKG 12 Lead; Future  -     CBC; Future  Rash- PERIODIC YEAST INFECTION, CONT PRN LOTRISONE  -     clotrimazole-betamethasone (LOTRISONE) 1-0.05 % cream; Apply topically 2 times daily, Topical, 2 TIMES DAILY Starting Thu 8/15/2024, Until Wed 11/13/2024, For 90 days, Disp-45 each, R-1, Normal  Routine general medical examination at a health care facility  -     Comprehensive Metabolic Panel; Future  -     Lipid Panel; Future  -     CBC; Future  -     TSH; Future  Screening for malignant neoplasm of prostate  -     PSA, Prostatic Specific Antigen; Future  Tobacco dependence- ALREADY CUT DOWN AND ENCOURAGED TO CONT AIMING FOR COMPLETE CESSATION  High triglycerides - Pt will continue to work on a low fat diet and also exercise, wt loss as appropriate.  Will continue periodic monitoring of fasting lipid profile, glucose, liver function.    -     Comprehensive Metabolic Panel; Future  -     Lipid Panel; Future  -     TSH; Future  Obesity (BMI 30-39.9)- WE'LL CONT TO MONITOR AND ENCOURAGE W WORKING ON DIET EXERCISE, WT LOSS    Chronic obstructive pulmonary disease, unspecified COPD type (HCC) - COPD stable on current regiment of inhalers/meds.    -     budesonide-formoterol (SYMBICORT) 160-4.5 MCG/ACT AERO; Inhale 2 puffs into the lungs 2 times daily, Disp-3 each, R-3Normal  Benign prostatic hyperplasia without lower urinary tract symptoms  -

## 2024-08-16 DIAGNOSIS — E78.2 MIXED HYPERLIPIDEMIA: ICD-10-CM

## 2024-08-16 RX ORDER — LOVASTATIN 10 MG/1
10 TABLET ORAL NIGHTLY
Qty: 90 TABLET | Refills: 3 | Status: SHIPPED | OUTPATIENT
Start: 2024-08-16

## 2024-08-16 NOTE — RESULT ENCOUNTER NOTE
Please call pt, lab ok incl chol level., PSA AND THYROID FXN.  CHOL HAS DROPPED FROM 176-159 AND GLC FROM 103 TO 97.

## 2025-08-05 DIAGNOSIS — J44.9 CHRONIC OBSTRUCTIVE PULMONARY DISEASE, UNSPECIFIED COPD TYPE (HCC): ICD-10-CM

## 2025-08-05 DIAGNOSIS — E78.2 MIXED HYPERLIPIDEMIA: ICD-10-CM

## 2025-08-05 DIAGNOSIS — J43.9 PULMONARY EMPHYSEMA, UNSPECIFIED EMPHYSEMA TYPE (HCC): ICD-10-CM

## 2025-08-05 DIAGNOSIS — I10 ESSENTIAL HYPERTENSION: ICD-10-CM

## 2025-08-05 RX ORDER — BUDESONIDE AND FORMOTEROL FUMARATE DIHYDRATE 160; 4.5 UG/1; UG/1
2 AEROSOL RESPIRATORY (INHALATION) 2 TIMES DAILY
Qty: 3 EACH | Refills: 0 | Status: SHIPPED | OUTPATIENT
Start: 2025-08-05

## 2025-08-05 RX ORDER — LOSARTAN POTASSIUM 50 MG/1
50 TABLET ORAL DAILY
Qty: 90 TABLET | Refills: 0 | Status: SHIPPED | OUTPATIENT
Start: 2025-08-05

## 2025-08-05 RX ORDER — LOVASTATIN 10 MG/1
10 TABLET ORAL NIGHTLY
Qty: 90 TABLET | Refills: 0 | Status: SHIPPED | OUTPATIENT
Start: 2025-08-05

## 2025-08-11 DIAGNOSIS — I10 ESSENTIAL HYPERTENSION: ICD-10-CM

## 2025-08-11 RX ORDER — LOSARTAN POTASSIUM 50 MG/1
50 TABLET ORAL DAILY
Qty: 90 TABLET | Refills: 0 | OUTPATIENT
Start: 2025-08-11

## 2025-08-12 ENCOUNTER — OFFICE VISIT (OUTPATIENT)
Dept: INTERNAL MEDICINE CLINIC | Age: 65
End: 2025-08-12

## 2025-08-12 VITALS
HEIGHT: 74 IN | HEART RATE: 71 BPM | BODY MASS INDEX: 34.83 KG/M2 | OXYGEN SATURATION: 97 % | WEIGHT: 271.4 LBS | SYSTOLIC BLOOD PRESSURE: 134 MMHG | DIASTOLIC BLOOD PRESSURE: 76 MMHG

## 2025-08-12 DIAGNOSIS — J44.9 CHRONIC OBSTRUCTIVE PULMONARY DISEASE, UNSPECIFIED COPD TYPE (HCC): ICD-10-CM

## 2025-08-12 DIAGNOSIS — I10 ESSENTIAL HYPERTENSION: Primary | ICD-10-CM

## 2025-08-12 DIAGNOSIS — Z12.12 SCREENING FOR COLORECTAL CANCER: ICD-10-CM

## 2025-08-12 DIAGNOSIS — E78.2 MIXED HYPERLIPIDEMIA: ICD-10-CM

## 2025-08-12 DIAGNOSIS — J43.9 PULMONARY EMPHYSEMA, UNSPECIFIED EMPHYSEMA TYPE (HCC): ICD-10-CM

## 2025-08-12 DIAGNOSIS — N40.0 BENIGN PROSTATIC HYPERPLASIA WITHOUT LOWER URINARY TRACT SYMPTOMS: ICD-10-CM

## 2025-08-12 DIAGNOSIS — Z12.11 SCREENING FOR COLORECTAL CANCER: ICD-10-CM

## 2025-08-12 RX ORDER — TRIAMCINOLONE ACETONIDE 1 MG/G
CREAM TOPICAL
COMMUNITY
Start: 2025-07-12

## 2025-08-12 RX ORDER — LOVASTATIN 10 MG/1
10 TABLET ORAL NIGHTLY
Qty: 90 TABLET | Refills: 3 | Status: SHIPPED | OUTPATIENT
Start: 2025-08-12

## 2025-08-12 RX ORDER — MUPIROCIN 2 %
OINTMENT (GRAM) TOPICAL
COMMUNITY
Start: 2025-07-09

## 2025-08-12 RX ORDER — LOSARTAN POTASSIUM 50 MG/1
50 TABLET ORAL DAILY
Qty: 90 TABLET | Refills: 3 | Status: SHIPPED | OUTPATIENT
Start: 2025-08-12

## 2025-08-12 SDOH — ECONOMIC STABILITY: FOOD INSECURITY: WITHIN THE PAST 12 MONTHS, YOU WORRIED THAT YOUR FOOD WOULD RUN OUT BEFORE YOU GOT MONEY TO BUY MORE.: NEVER TRUE

## 2025-08-12 SDOH — ECONOMIC STABILITY: FOOD INSECURITY: WITHIN THE PAST 12 MONTHS, THE FOOD YOU BOUGHT JUST DIDN'T LAST AND YOU DIDN'T HAVE MONEY TO GET MORE.: NEVER TRUE

## 2025-08-12 ASSESSMENT — PATIENT HEALTH QUESTIONNAIRE - PHQ9
SUM OF ALL RESPONSES TO PHQ QUESTIONS 1-9: 0
2. FEELING DOWN, DEPRESSED OR HOPELESS: NOT AT ALL
1. LITTLE INTEREST OR PLEASURE IN DOING THINGS: NOT AT ALL

## 2025-08-13 LAB
ALBUMIN: 4.5 G/DL (ref 3.9–4.9)
ALP BLD-CCNC: 71 IU/L (ref 44–121)
ALT SERPL-CCNC: 24 IU/L (ref 0–44)
AST SERPL-CCNC: 23 IU/L (ref 0–40)
BASOPHILS ABSOLUTE: 0.1 X10E3/UL (ref 0–0.2)
BASOPHILS RELATIVE PERCENT: 1 %
BILIRUB SERPL-MCNC: 0.5 MG/DL (ref 0–1.2)
BUN / CREAT RATIO: 9 (ref 10–24)
BUN BLDV-MCNC: 9 MG/DL (ref 8–27)
CALCIUM SERPL-MCNC: 9.5 MG/DL (ref 8.6–10.2)
CHLORIDE BLD-SCNC: 101 MMOL/L (ref 96–106)
CHOLESTEROL, TOTAL: 172 MG/DL (ref 100–199)
CO2: 23 MMOL/L (ref 20–29)
CREAT SERPL-MCNC: 1.05 MG/DL (ref 0.76–1.27)
EOSINOPHILS ABSOLUTE: 0.3 X10E3/UL (ref 0–0.4)
EOSINOPHILS RELATIVE PERCENT: 3 %
ESTIMATED GLOMERULAR FILTRATION RATE CREATININE EQUATION: 78 ML/MIN/1.73
GLOBULIN: 2.3 G/DL (ref 1.5–4.5)
GLUCOSE BLD-MCNC: 87 MG/DL (ref 70–99)
HCT VFR BLD CALC: 44.7 % (ref 37.5–51)
HDLC SERPL-MCNC: 45 MG/DL
HEMOGLOBIN: 14.8 G/DL (ref 13–17.7)
IMMATURE GRANS (ABS): 0.1 X10E3/UL (ref 0–0.1)
IMMATURE GRANULOCYTES %: 2 %
LDL CHOLESTEROL: 99 MG/DL (ref 0–99)
LYMPHOCYTES ABSOLUTE: 1.6 X10E3/UL (ref 0.7–3.1)
LYMPHOCYTES RELATIVE PERCENT: 18 %
MCH RBC QN AUTO: 31.8 PG (ref 26.6–33)
MCHC RBC AUTO-ENTMCNC: 33.1 G/DL (ref 31.5–35.7)
MCV RBC AUTO: 96 FL (ref 79–97)
MONOCYTES ABSOLUTE: 0.6 X10E3/UL (ref 0.1–0.9)
MONOCYTES RELATIVE PERCENT: 7 %
NEUTROPHILS ABSOLUTE: 6.1 X10E3/UL (ref 1.4–7)
NEUTROPHILS RELATIVE PERCENT: 69 %
PDW BLD-RTO: 13.7 % (ref 11.6–15.4)
PLATELET # BLD: 202 X10E3/UL (ref 150–450)
POTASSIUM SERPL-SCNC: 4.9 MMOL/L (ref 3.5–5.2)
PROSTATE SPECIFIC ANTIGEN: 2.3 NG/ML (ref 0–4)
RBC # BLD: 4.66 X10E6/UL (ref 4.14–5.8)
SODIUM BLD-SCNC: 139 MMOL/L (ref 134–144)
TOTAL PROTEIN: 6.8 G/DL (ref 6–8.5)
TRIGL SERPL-MCNC: 159 MG/DL (ref 0–149)
TSH SERPL DL<=0.05 MIU/L-ACNC: 1.8 UIU/ML (ref 0.45–4.5)
VLDLC SERPL CALC-MCNC: 28 MG/DL (ref 5–40)
WBC # BLD: 8.8 X10E3/UL (ref 3.4–10.8)

## 2025-08-18 LAB
Lab: NORMAL
OCCULT BLOOD, FECAL, IA: NEGATIVE

## 2025-08-27 ENCOUNTER — TELEPHONE (OUTPATIENT)
Dept: INTERNAL MEDICINE CLINIC | Age: 65
End: 2025-08-27